# Patient Record
Sex: FEMALE | Race: WHITE | Employment: OTHER | URBAN - METROPOLITAN AREA
[De-identification: names, ages, dates, MRNs, and addresses within clinical notes are randomized per-mention and may not be internally consistent; named-entity substitution may affect disease eponyms.]

---

## 2019-06-17 ENCOUNTER — OFFICE VISIT (OUTPATIENT)
Dept: ORTHOPEDIC SURGERY | Age: 69
End: 2019-06-17
Payer: MEDICARE

## 2019-06-17 DIAGNOSIS — M77.42 METATARSALGIA OF LEFT FOOT: ICD-10-CM

## 2019-06-17 DIAGNOSIS — M79.672 LEFT FOOT PAIN: ICD-10-CM

## 2019-06-17 DIAGNOSIS — S96.912A STRAIN OF LEFT FOOT, INITIAL ENCOUNTER: Primary | ICD-10-CM

## 2019-06-17 DIAGNOSIS — Z87.828 HX OF LACERATION OF SKIN: ICD-10-CM

## 2019-06-17 DIAGNOSIS — M67.472 GANGLION CYST OF LEFT FOOT: ICD-10-CM

## 2019-06-17 PROCEDURE — L3260 AMBULATORY SURGICAL BOOT EAC: HCPCS | Performed by: INTERNAL MEDICINE

## 2019-06-17 PROCEDURE — 99203 OFFICE O/P NEW LOW 30 MIN: CPT | Performed by: INTERNAL MEDICINE

## 2019-06-17 NOTE — PROGRESS NOTES
Chief Complaint:   Chief Complaint   Patient presents with    Ankle Pain     NEW L ANKLE/FOOT PAIN          History of Present Illness:       Patient is a 76 y.o. female presents with the above complaint. The symptoms began 9 daysago and started without an injury. The patient describes a sharp, pins and needles pain over the medial foot into the toes and tentatively overlying the dorsal prominence of the foot that does not radiate. The symptoms are intermittent  and are show no change since the onset. Pain localizes to the dorsum of the foot and MTP joint  aspect and  is predictably aggravated by weightbearing. There are not mechanical symptoms associated with the symptoms. There are occasional neuritic symptoms involving the foot radiating from the medial aspect into the toes. The patient denies subjective instability about the foot or ankle and denies new onset or progressive weakness of the lower extremity. Pain level 8    The patient admits to a pattern of activity related swelling. Treatment to date: X-ray evaluation    There is  history of foot or ankle trauma related to a skin laceration localizing to the anterolateral aspect of the ankle in the region of the superficial peroneal nerve. She has residual hypersensitivity in this anatomic region at the site of the scar. There is no prior history of autoimmune disease, crystal arthropathy, or crystal arthropathy. Past Medical History:        Past Medical History:   Diagnosis Date    Diverticulitis     Hyperlipidemia     Hypertension          Past Surgical History:   Procedure Laterality Date    CORONARY ANGIOPLASTY WITH STENT PLACEMENT      OVARY REMOVAL  right    FOR OVARIAN CYSTS         Present Medications:         Current Outpatient Medications   Medication Sig Dispense Refill    lisinopril-hydrochlorothiazide (PRINZIDE;ZESTORETIC) 20-12.5 MG per tablet Take 1 tablet by mouth daily.          No current facility-administered medications for this visit. Allergies:      No Known Allergies     Social History:         Social History     Socioeconomic History    Marital status:      Spouse name: Not on file    Number of children: Not on file    Years of education: Not on file    Highest education level: Not on file   Occupational History    Not on file   Social Needs    Financial resource strain: Not on file    Food insecurity:     Worry: Not on file     Inability: Not on file    Transportation needs:     Medical: Not on file     Non-medical: Not on file   Tobacco Use    Smoking status: Never Smoker   Substance and Sexual Activity    Alcohol use: No    Drug use: No    Sexual activity: Not on file   Lifestyle    Physical activity:     Days per week: Not on file     Minutes per session: Not on file    Stress: Not on file   Relationships    Social connections:     Talks on phone: Not on file     Gets together: Not on file     Attends [employer] service: Not on file     Active member of club or organization: Not on file     Attends meetings of clubs or organizations: Not on file     Relationship status: Not on file    Intimate partner violence:     Fear of current or ex partner: Not on file     Emotionally abused: Not on file     Physically abused: Not on file     Forced sexual activity: Not on file   Other Topics Concern    Not on file   Social History Narrative    Not on file        Review of Symptoms:    Pertinent items are noted in HPI      Vital Signs: There were no vitals filed for this visit. General Exam:     Constitutional: Patient is adequately groomed with no evidence of malnutrition  Mental Status: The patient is oriented to time, place and person. The patient's mood and affect are appropriate. Vascular: Examination reveals no swelling or calf tenderness. Peripheral pulses are palpable and 2+.     Lymphatics: no lymphadenopathy of the inguinal region or lower extremity      Physical Exam: by a healthcare professional with expert knowledge and specialization in brace application. Verbal and written instructions for the use of and application of this item were provided. They were instructed to contact the office immediately should the brace result in increased pain, decreased sensation, increased swelling or worsening of the condition. Other Outside Imaging and Testing Personally Reviewed:     2019 June  XR ANKLE LEFT AP LATERAL AND OBLIQUE6/8/2019  [facility]   Result Impression       No acute fracture or joint effusion    Mild bimalleolar ankle arthrosis    Calcaneal spurs. Result Narrative   HISTORY:  pain  ENTIRE LEFT ANKLE PAIN, NO INJURY  COMPARISON: None  NOTE:  If there are questions about the content of this report, please contact [address] [place] [address] radiology by calling [phone]    FINDINGS:  BONES:  Mild spurring of both malleoli demonstrated. JOINTS:  Unremarkable.   No evidence of significant joint space narrowing, spurring, or malalignment  SOFT TISSUES:  Unremarkable  OTHER:  None   Other Result Information   Interface, Rad Results In - 06/08/2019  4:50 PM EDT  HISTORY:  pain  ENTIRE LEFT ANKLE PAIN, NO INJURY  COMPARISON: None  NOTE:  If there are questions about the content of this report, please contact [facility] radiology by calling [phone]    FINDINGS:  BONES:  Mild spurring of both malleoli demonstrated. JOINTS:  Unremarkable. No evidence of significant joint space narrowing, spurring, or malalignment  SOFT TISSUES:  Unremarkable  OTHER:  None    IMPRESSION      No acute fracture or joint effusion    Mild bimalleolar ankle arthrosis    Calcaneal spurs. Status               Assessment   Impression: . Encounter Diagnoses   Name Primary?     Strain of left foot, initial encounter Yes    Ganglion cyst of left foot     Metatarsalgia of left foot     Hx of laceration of skin     Left foot pain               Plan:     Postop shoe immobilization and foot intrinsics  Limited ultrasound evaluation of the soft tissue cystic prominence and consider aspiration of the cyst under ultrasound guidance at that time  Monitor neuritic symptoms and consider EMG left lower extremity PRN low clinical suspicion for radicular pain at this time  Consider compounding cream for the neuropathic pain localizing to the previous site of skin laceration    The neuritic pain in her foot may well be related to soft tissue swelling and peripheral nerve irritation proceed as outlined above      The nature of the finding, probable diagnosis and likely treatment was thoroughly discussed with the patient. The options, risks, complications, alternative treatment as well as some of the differential diagnosis was discussed. The patient was thoroughly informed and all questions were answered. the patient indicated understanding and satisfaction with the discussion. Orders:        Orders Placed This Encounter   Procedures    DJO Post-Op Shoe     Patient was prescribed a DJO Post-Op Shoe. The left foot will require stabilization / immobilization from this semi-rigid / rigid orthosis to improve their function. The orthosis will assist in protecting the affected area, provide functional support and facilitate healing. Patient was instructed to progress ambulation weight bearing as tolerated in the device. The patient was educated and fit by a healthcare professional with expert knowledge and specialization in brace application. Verbal and written instructions for the use of and application of this item were provided. They were instructed to contact the office immediately should the brace result in increased pain, decreased sensation, increased swelling or worsening of the condition. Disclaimer: \"This note was dictated with voice recognition software. Though review and correction are routine, we apologize for any errors. \"

## 2019-06-24 ENCOUNTER — OFFICE VISIT (OUTPATIENT)
Dept: ORTHOPEDIC SURGERY | Age: 69
End: 2019-06-24
Payer: MEDICARE

## 2019-06-24 VITALS — HEIGHT: 60 IN | WEIGHT: 173 LBS | BODY MASS INDEX: 33.96 KG/M2

## 2019-06-24 DIAGNOSIS — M77.42 METATARSALGIA OF LEFT FOOT: ICD-10-CM

## 2019-06-24 DIAGNOSIS — S96.912D STRAIN OF LEFT FOOT, SUBSEQUENT ENCOUNTER: ICD-10-CM

## 2019-06-24 DIAGNOSIS — M67.472 GANGLION CYST OF LEFT FOOT: Primary | ICD-10-CM

## 2019-06-24 PROCEDURE — G8417 CALC BMI ABV UP PARAM F/U: HCPCS | Performed by: INTERNAL MEDICINE

## 2019-06-24 PROCEDURE — G8400 PT W/DXA NO RESULTS DOC: HCPCS | Performed by: INTERNAL MEDICINE

## 2019-06-24 PROCEDURE — G8427 DOCREV CUR MEDS BY ELIG CLIN: HCPCS | Performed by: INTERNAL MEDICINE

## 2019-06-24 PROCEDURE — 1123F ACP DISCUSS/DSCN MKR DOCD: CPT | Performed by: INTERNAL MEDICINE

## 2019-06-24 PROCEDURE — 1090F PRES/ABSN URINE INCON ASSESS: CPT | Performed by: INTERNAL MEDICINE

## 2019-06-24 PROCEDURE — 4004F PT TOBACCO SCREEN RCVD TLK: CPT | Performed by: INTERNAL MEDICINE

## 2019-06-24 PROCEDURE — 4040F PNEUMOC VAC/ADMIN/RCVD: CPT | Performed by: INTERNAL MEDICINE

## 2019-06-24 PROCEDURE — 3017F COLORECTAL CA SCREEN DOC REV: CPT | Performed by: INTERNAL MEDICINE

## 2019-06-24 PROCEDURE — 99213 OFFICE O/P EST LOW 20 MIN: CPT | Performed by: INTERNAL MEDICINE

## 2019-11-22 ENCOUNTER — APPOINTMENT (OUTPATIENT)
Dept: GENERAL RADIOLOGY | Age: 69
End: 2019-11-22
Payer: MEDICARE

## 2019-11-22 ENCOUNTER — HOSPITAL ENCOUNTER (EMERGENCY)
Age: 69
Discharge: HOME | End: 2019-11-22
Attending: EMERGENCY MEDICINE
Payer: MEDICARE

## 2019-11-22 VITALS
BODY MASS INDEX: 34.36 KG/M2 | DIASTOLIC BLOOD PRESSURE: 63 MMHG | HEART RATE: 72 BPM | HEIGHT: 60 IN | WEIGHT: 175 LBS | TEMPERATURE: 98.5 F | OXYGEN SATURATION: 99 % | SYSTOLIC BLOOD PRESSURE: 171 MMHG | RESPIRATION RATE: 17 BRPM

## 2019-11-22 DIAGNOSIS — E86.0 DEHYDRATION: ICD-10-CM

## 2019-11-22 DIAGNOSIS — R55 SYNCOPE, UNSPECIFIED SYNCOPE TYPE: Primary | ICD-10-CM

## 2019-11-22 LAB
ANION GAP SERPL CALCULATED.3IONS-SCNC: 11 MMOL/L (ref 3–16)
BASOPHILS ABSOLUTE: 0 K/UL (ref 0–0.2)
BASOPHILS RELATIVE PERCENT: 0.5 %
BILIRUBIN URINE: NEGATIVE
BLOOD, URINE: NEGATIVE
BUN BLDV-MCNC: 24 MG/DL (ref 7–20)
CALCIUM SERPL-MCNC: 9.1 MG/DL (ref 8.3–10.6)
CHLORIDE BLD-SCNC: 103 MMOL/L (ref 99–110)
CLARITY: CLEAR
CO2: 23 MMOL/L (ref 21–32)
COLOR: YELLOW
CREAT SERPL-MCNC: 0.7 MG/DL (ref 0.6–1.2)
EOSINOPHILS ABSOLUTE: 0 K/UL (ref 0–0.6)
EOSINOPHILS RELATIVE PERCENT: 0.3 %
GFR AFRICAN AMERICAN: >60
GFR NON-AFRICAN AMERICAN: >60
GLUCOSE BLD-MCNC: 190 MG/DL (ref 70–99)
GLUCOSE BLD-MCNC: 201 MG/DL (ref 70–99)
GLUCOSE URINE: 500 MG/DL
HCT VFR BLD CALC: 36.6 % (ref 36–48)
HEMOGLOBIN: 12.5 G/DL (ref 12–16)
KETONES, URINE: NEGATIVE MG/DL
LEUKOCYTE ESTERASE, URINE: NEGATIVE
LYMPHOCYTES ABSOLUTE: 1.2 K/UL (ref 1–5.1)
LYMPHOCYTES RELATIVE PERCENT: 13.6 %
MCH RBC QN AUTO: 29.6 PG (ref 26–34)
MCHC RBC AUTO-ENTMCNC: 34 G/DL (ref 31–36)
MCV RBC AUTO: 86.9 FL (ref 80–100)
MICROSCOPIC EXAMINATION: ABNORMAL
MONOCYTES ABSOLUTE: 0.8 K/UL (ref 0–1.3)
MONOCYTES RELATIVE PERCENT: 9.5 %
NEUTROPHILS ABSOLUTE: 6.4 K/UL (ref 1.7–7.7)
NEUTROPHILS RELATIVE PERCENT: 76.1 %
NITRITE, URINE: NEGATIVE
PDW BLD-RTO: 13.5 % (ref 12.4–15.4)
PERFORMED ON: ABNORMAL
PH UA: 6 (ref 5–8)
PLATELET # BLD: 183 K/UL (ref 135–450)
PMV BLD AUTO: 8.3 FL (ref 5–10.5)
POTASSIUM REFLEX MAGNESIUM: 4.4 MMOL/L (ref 3.5–5.1)
PROTEIN UA: NEGATIVE MG/DL
RBC # BLD: 4.21 M/UL (ref 4–5.2)
SODIUM BLD-SCNC: 137 MMOL/L (ref 136–145)
SPECIFIC GRAVITY UA: 1.02 (ref 1–1.03)
URINE REFLEX TO CULTURE: ABNORMAL
URINE TYPE: ABNORMAL
UROBILINOGEN, URINE: 0.2 E.U./DL
WBC # BLD: 8.5 K/UL (ref 4–11)

## 2019-11-22 PROCEDURE — 93005 ELECTROCARDIOGRAM TRACING: CPT | Performed by: STUDENT IN AN ORGANIZED HEALTH CARE EDUCATION/TRAINING PROGRAM

## 2019-11-22 PROCEDURE — 99284 EMERGENCY DEPT VISIT MOD MDM: CPT

## 2019-11-22 PROCEDURE — 81003 URINALYSIS AUTO W/O SCOPE: CPT

## 2019-11-22 PROCEDURE — 71046 X-RAY EXAM CHEST 2 VIEWS: CPT

## 2019-11-22 PROCEDURE — 2580000003 HC RX 258: Performed by: STUDENT IN AN ORGANIZED HEALTH CARE EDUCATION/TRAINING PROGRAM

## 2019-11-22 PROCEDURE — 85025 COMPLETE CBC W/AUTO DIFF WBC: CPT

## 2019-11-22 PROCEDURE — 96360 HYDRATION IV INFUSION INIT: CPT

## 2019-11-22 PROCEDURE — 80048 BASIC METABOLIC PNL TOTAL CA: CPT

## 2019-11-22 RX ORDER — CLOPIDOGREL BISULFATE 75 MG/1
75 TABLET ORAL DAILY
COMMUNITY

## 2019-11-22 RX ORDER — SODIUM CHLORIDE, SODIUM LACTATE, POTASSIUM CHLORIDE, CALCIUM CHLORIDE 600; 310; 30; 20 MG/100ML; MG/100ML; MG/100ML; MG/100ML
1000 INJECTION, SOLUTION INTRAVENOUS ONCE
Status: COMPLETED | OUTPATIENT
Start: 2019-11-22 | End: 2019-11-22

## 2019-11-22 RX ADMIN — SODIUM CHLORIDE, POTASSIUM CHLORIDE, SODIUM LACTATE AND CALCIUM CHLORIDE 1000 ML: 600; 310; 30; 20 INJECTION, SOLUTION INTRAVENOUS at 20:21

## 2019-11-22 ASSESSMENT — ENCOUNTER SYMPTOMS
COUGH: 0
CONSTIPATION: 0
VOMITING: 0
SHORTNESS OF BREATH: 0
ABDOMINAL PAIN: 0
BACK PAIN: 0

## 2019-11-23 LAB
EKG ATRIAL RATE: 77 BPM
EKG DIAGNOSIS: NORMAL
EKG P AXIS: 36 DEGREES
EKG P-R INTERVAL: 142 MS
EKG Q-T INTERVAL: 388 MS
EKG QRS DURATION: 90 MS
EKG QTC CALCULATION (BAZETT): 439 MS
EKG R AXIS: -35 DEGREES
EKG T AXIS: 19 DEGREES
EKG VENTRICULAR RATE: 77 BPM

## 2022-01-08 ENCOUNTER — HOSPITAL ENCOUNTER (INPATIENT)
Age: 72
LOS: 3 days | Discharge: HOME | DRG: 392 | End: 2022-01-13
Attending: INTERNAL MEDICINE | Admitting: INTERNAL MEDICINE
Payer: MEDICARE

## 2022-01-08 ENCOUNTER — APPOINTMENT (OUTPATIENT)
Dept: CT IMAGING | Age: 72
DRG: 392 | End: 2022-01-08
Payer: MEDICARE

## 2022-01-08 DIAGNOSIS — R11.2 NAUSEA VOMITING AND DIARRHEA: Primary | ICD-10-CM

## 2022-01-08 DIAGNOSIS — R06.02 SHORTNESS OF BREATH: ICD-10-CM

## 2022-01-08 DIAGNOSIS — E86.0 DEHYDRATION: ICD-10-CM

## 2022-01-08 DIAGNOSIS — R93.5 ABNORMAL ABDOMINAL CT SCAN: ICD-10-CM

## 2022-01-08 DIAGNOSIS — R19.7 NAUSEA VOMITING AND DIARRHEA: Primary | ICD-10-CM

## 2022-01-08 DIAGNOSIS — E87.1 HYPONATREMIA: ICD-10-CM

## 2022-01-08 LAB
A/G RATIO: 1.1 (ref 1.1–2.2)
ALBUMIN SERPL-MCNC: 4 G/DL (ref 3.4–5)
ALP BLD-CCNC: 80 U/L (ref 40–129)
ALT SERPL-CCNC: 15 U/L (ref 10–40)
ANION GAP SERPL CALCULATED.3IONS-SCNC: 14 MMOL/L (ref 3–16)
AST SERPL-CCNC: 37 U/L (ref 15–37)
BACTERIA: ABNORMAL /HPF
BASOPHILS ABSOLUTE: 0 K/UL (ref 0–0.2)
BASOPHILS RELATIVE PERCENT: 0.2 %
BILIRUB SERPL-MCNC: 0.4 MG/DL (ref 0–1)
BILIRUBIN URINE: ABNORMAL
BLOOD, URINE: NEGATIVE
BUN BLDV-MCNC: 20 MG/DL (ref 7–20)
CALCIUM SERPL-MCNC: 9 MG/DL (ref 8.3–10.6)
CHLORIDE BLD-SCNC: 95 MMOL/L (ref 99–110)
CLARITY: ABNORMAL
CO2: 18 MMOL/L (ref 21–32)
COLOR: YELLOW
CREAT SERPL-MCNC: 0.6 MG/DL (ref 0.6–1.2)
EOSINOPHILS ABSOLUTE: 0 K/UL (ref 0–0.6)
EOSINOPHILS RELATIVE PERCENT: 0.2 %
EPITHELIAL CELLS, UA: 13 /HPF (ref 0–5)
GFR AFRICAN AMERICAN: >60
GFR NON-AFRICAN AMERICAN: >60
GLUCOSE BLD-MCNC: 188 MG/DL (ref 70–99)
GLUCOSE BLD-MCNC: 245 MG/DL (ref 70–99)
GLUCOSE URINE: >=1000 MG/DL
HCT VFR BLD CALC: 42.2 % (ref 36–48)
HEMOGLOBIN: 14.4 G/DL (ref 12–16)
KETONES, URINE: 40 MG/DL
LEUKOCYTE ESTERASE, URINE: NEGATIVE
LIPASE: 20 U/L (ref 13–60)
LYMPHOCYTES ABSOLUTE: 0.4 K/UL (ref 1–5.1)
LYMPHOCYTES RELATIVE PERCENT: 4.4 %
MCH RBC QN AUTO: 29 PG (ref 26–34)
MCHC RBC AUTO-ENTMCNC: 34.1 G/DL (ref 31–36)
MCV RBC AUTO: 85.1 FL (ref 80–100)
MICROSCOPIC EXAMINATION: YES
MONOCYTES ABSOLUTE: 0.5 K/UL (ref 0–1.3)
MONOCYTES RELATIVE PERCENT: 4.9 %
NEUTROPHILS ABSOLUTE: 8.9 K/UL (ref 1.7–7.7)
NEUTROPHILS RELATIVE PERCENT: 90.3 %
NITRITE, URINE: NEGATIVE
PDW BLD-RTO: 13.6 % (ref 12.4–15.4)
PERFORMED ON: ABNORMAL
PH UA: 5.5 (ref 5–8)
PLATELET # BLD: 217 K/UL (ref 135–450)
PMV BLD AUTO: 8.4 FL (ref 5–10.5)
POTASSIUM SERPL-SCNC: 4.8 MMOL/L (ref 3.5–5.1)
PRO-BNP: 370 PG/ML (ref 0–124)
PROTEIN UA: ABNORMAL MG/DL
RBC # BLD: 4.96 M/UL (ref 4–5.2)
RBC UA: 5 /HPF (ref 0–4)
SODIUM BLD-SCNC: 127 MMOL/L (ref 136–145)
SPECIFIC GRAVITY UA: >1.03 (ref 1–1.03)
TOTAL PROTEIN: 7.6 G/DL (ref 6.4–8.2)
TROPONIN: <0.01 NG/ML
URINE REFLEX TO CULTURE: ABNORMAL
URINE TYPE: ABNORMAL
UROBILINOGEN, URINE: 1 E.U./DL
WBC # BLD: 9.9 K/UL (ref 4–11)
WBC UA: 4 /HPF (ref 0–5)

## 2022-01-08 PROCEDURE — 84484 ASSAY OF TROPONIN QUANT: CPT

## 2022-01-08 PROCEDURE — 96361 HYDRATE IV INFUSION ADD-ON: CPT

## 2022-01-08 PROCEDURE — 85025 COMPLETE CBC W/AUTO DIFF WBC: CPT

## 2022-01-08 PROCEDURE — 74177 CT ABD & PELVIS W/CONTRAST: CPT

## 2022-01-08 PROCEDURE — 99284 EMERGENCY DEPT VISIT MOD MDM: CPT

## 2022-01-08 PROCEDURE — 83880 ASSAY OF NATRIURETIC PEPTIDE: CPT

## 2022-01-08 PROCEDURE — 6360000002 HC RX W HCPCS: Performed by: PHYSICIAN ASSISTANT

## 2022-01-08 PROCEDURE — 84300 ASSAY OF URINE SODIUM: CPT

## 2022-01-08 PROCEDURE — 83930 ASSAY OF BLOOD OSMOLALITY: CPT

## 2022-01-08 PROCEDURE — 81001 URINALYSIS AUTO W/SCOPE: CPT

## 2022-01-08 PROCEDURE — 36415 COLL VENOUS BLD VENIPUNCTURE: CPT

## 2022-01-08 PROCEDURE — 80053 COMPREHEN METABOLIC PANEL: CPT

## 2022-01-08 PROCEDURE — G0378 HOSPITAL OBSERVATION PER HR: HCPCS

## 2022-01-08 PROCEDURE — 96374 THER/PROPH/DIAG INJ IV PUSH: CPT

## 2022-01-08 PROCEDURE — 83036 HEMOGLOBIN GLYCOSYLATED A1C: CPT

## 2022-01-08 PROCEDURE — 2580000003 HC RX 258: Performed by: INTERNAL MEDICINE

## 2022-01-08 PROCEDURE — 83690 ASSAY OF LIPASE: CPT

## 2022-01-08 PROCEDURE — 71260 CT THORAX DX C+: CPT

## 2022-01-08 PROCEDURE — 6370000000 HC RX 637 (ALT 250 FOR IP): Performed by: INTERNAL MEDICINE

## 2022-01-08 PROCEDURE — 96375 TX/PRO/DX INJ NEW DRUG ADDON: CPT

## 2022-01-08 PROCEDURE — 6360000004 HC RX CONTRAST MEDICATION: Performed by: PHYSICIAN ASSISTANT

## 2022-01-08 PROCEDURE — 2580000003 HC RX 258: Performed by: PHYSICIAN ASSISTANT

## 2022-01-08 PROCEDURE — 83935 ASSAY OF URINE OSMOLALITY: CPT

## 2022-01-08 RX ORDER — SODIUM CHLORIDE 0.9 % (FLUSH) 0.9 %
5-40 SYRINGE (ML) INJECTION EVERY 12 HOURS SCHEDULED
Status: DISCONTINUED | OUTPATIENT
Start: 2022-01-08 | End: 2022-01-13 | Stop reason: HOSPADM

## 2022-01-08 RX ORDER — INSULIN LISPRO 100 [IU]/ML
0-6 INJECTION, SOLUTION INTRAVENOUS; SUBCUTANEOUS EVERY 4 HOURS
Status: DISCONTINUED | OUTPATIENT
Start: 2022-01-08 | End: 2022-01-11

## 2022-01-08 RX ORDER — SODIUM CHLORIDE 9 MG/ML
INJECTION, SOLUTION INTRAVENOUS CONTINUOUS
Status: ACTIVE | OUTPATIENT
Start: 2022-01-08 | End: 2022-01-10

## 2022-01-08 RX ORDER — SODIUM CHLORIDE 0.9 % (FLUSH) 0.9 %
10 SYRINGE (ML) INJECTION PRN
Status: DISCONTINUED | OUTPATIENT
Start: 2022-01-08 | End: 2022-01-13 | Stop reason: HOSPADM

## 2022-01-08 RX ORDER — ONDANSETRON 2 MG/ML
4 INJECTION INTRAMUSCULAR; INTRAVENOUS EVERY 6 HOURS PRN
Status: DISCONTINUED | OUTPATIENT
Start: 2022-01-08 | End: 2022-01-13 | Stop reason: HOSPADM

## 2022-01-08 RX ORDER — MAGNESIUM SULFATE 1 G/100ML
1000 INJECTION INTRAVENOUS PRN
Status: DISCONTINUED | OUTPATIENT
Start: 2022-01-08 | End: 2022-01-13 | Stop reason: HOSPADM

## 2022-01-08 RX ORDER — LISINOPRIL 20 MG/1
20 TABLET ORAL DAILY
Status: DISCONTINUED | OUTPATIENT
Start: 2022-01-09 | End: 2022-01-13 | Stop reason: HOSPADM

## 2022-01-08 RX ORDER — NICOTINE POLACRILEX 4 MG
15 LOZENGE BUCCAL PRN
Status: DISCONTINUED | OUTPATIENT
Start: 2022-01-08 | End: 2022-01-13 | Stop reason: HOSPADM

## 2022-01-08 RX ORDER — 0.9 % SODIUM CHLORIDE 0.9 %
1000 INTRAVENOUS SOLUTION INTRAVENOUS ONCE
Status: COMPLETED | OUTPATIENT
Start: 2022-01-08 | End: 2022-01-08

## 2022-01-08 RX ORDER — POTASSIUM CHLORIDE 7.45 MG/ML
10 INJECTION INTRAVENOUS PRN
Status: DISCONTINUED | OUTPATIENT
Start: 2022-01-08 | End: 2022-01-13 | Stop reason: HOSPADM

## 2022-01-08 RX ORDER — MORPHINE SULFATE 4 MG/ML
4 INJECTION, SOLUTION INTRAMUSCULAR; INTRAVENOUS ONCE
Status: COMPLETED | OUTPATIENT
Start: 2022-01-08 | End: 2022-01-08

## 2022-01-08 RX ORDER — ACETAMINOPHEN 650 MG/1
650 SUPPOSITORY RECTAL EVERY 6 HOURS PRN
Status: DISCONTINUED | OUTPATIENT
Start: 2022-01-08 | End: 2022-01-13 | Stop reason: HOSPADM

## 2022-01-08 RX ORDER — CLOPIDOGREL BISULFATE 75 MG/1
75 TABLET ORAL DAILY
Status: DISCONTINUED | OUTPATIENT
Start: 2022-01-09 | End: 2022-01-13 | Stop reason: HOSPADM

## 2022-01-08 RX ORDER — POLYETHYLENE GLYCOL 3350 17 G/17G
17 POWDER, FOR SOLUTION ORAL DAILY PRN
Status: DISCONTINUED | OUTPATIENT
Start: 2022-01-08 | End: 2022-01-13 | Stop reason: HOSPADM

## 2022-01-08 RX ORDER — ONDANSETRON 4 MG/1
4 TABLET, ORALLY DISINTEGRATING ORAL EVERY 8 HOURS PRN
Status: DISCONTINUED | OUTPATIENT
Start: 2022-01-08 | End: 2022-01-13 | Stop reason: HOSPADM

## 2022-01-08 RX ORDER — SODIUM CHLORIDE 9 MG/ML
25 INJECTION, SOLUTION INTRAVENOUS PRN
Status: DISCONTINUED | OUTPATIENT
Start: 2022-01-08 | End: 2022-01-13 | Stop reason: HOSPADM

## 2022-01-08 RX ORDER — DEXTROSE MONOHYDRATE 25 G/50ML
12.5 INJECTION, SOLUTION INTRAVENOUS PRN
Status: DISCONTINUED | OUTPATIENT
Start: 2022-01-08 | End: 2022-01-13 | Stop reason: HOSPADM

## 2022-01-08 RX ORDER — ACETAMINOPHEN 325 MG/1
650 TABLET ORAL EVERY 6 HOURS PRN
Status: DISCONTINUED | OUTPATIENT
Start: 2022-01-08 | End: 2022-01-13 | Stop reason: HOSPADM

## 2022-01-08 RX ORDER — DEXTROSE MONOHYDRATE 50 MG/ML
100 INJECTION, SOLUTION INTRAVENOUS PRN
Status: DISCONTINUED | OUTPATIENT
Start: 2022-01-08 | End: 2022-01-13 | Stop reason: HOSPADM

## 2022-01-08 RX ORDER — ONDANSETRON 2 MG/ML
4 INJECTION INTRAMUSCULAR; INTRAVENOUS ONCE
Status: COMPLETED | OUTPATIENT
Start: 2022-01-08 | End: 2022-01-08

## 2022-01-08 RX ADMIN — SODIUM CHLORIDE: 9 INJECTION, SOLUTION INTRAVENOUS at 20:36

## 2022-01-08 RX ADMIN — INSULIN LISPRO 1 UNITS: 100 INJECTION, SOLUTION INTRAVENOUS; SUBCUTANEOUS at 23:05

## 2022-01-08 RX ADMIN — ONDANSETRON 4 MG: 2 INJECTION INTRAMUSCULAR; INTRAVENOUS at 17:31

## 2022-01-08 RX ADMIN — MORPHINE SULFATE 4 MG: 4 INJECTION INTRAVENOUS at 17:32

## 2022-01-08 RX ADMIN — IOPAMIDOL 75 ML: 755 INJECTION, SOLUTION INTRAVENOUS at 17:40

## 2022-01-08 RX ADMIN — SODIUM CHLORIDE 1000 ML: 9 INJECTION, SOLUTION INTRAVENOUS at 17:36

## 2022-01-08 RX ADMIN — INSULIN GLARGINE 12 UNITS: 100 INJECTION, SOLUTION SUBCUTANEOUS at 23:05

## 2022-01-08 ASSESSMENT — ENCOUNTER SYMPTOMS
SHORTNESS OF BREATH: 1
ANAL BLEEDING: 0
ABDOMINAL PAIN: 1
RECTAL PAIN: 0
SHORTNESS OF BREATH: 0
COLOR CHANGE: 0
CONSTIPATION: 0
EYES NEGATIVE: 1
NAUSEA: 1
DIARRHEA: 1
STRIDOR: 0
VOMITING: 1
BACK PAIN: 1
COUGH: 1
ABDOMINAL DISTENTION: 1
WHEEZING: 0
BLOOD IN STOOL: 0

## 2022-01-08 ASSESSMENT — PAIN SCALES - GENERAL
PAINLEVEL_OUTOF10: 9
PAINLEVEL_OUTOF10: 0
PAINLEVEL_OUTOF10: 9
PAINLEVEL_OUTOF10: 0

## 2022-01-08 NOTE — ED PROVIDER NOTES
[facility]        Pt Name: Katy Kong  MRN: [id number]  [place] 1950  Date of evaluation: 1/8/2022  Provider: Vince Gallegos PA-C  PCP: Michael Parish  Note Started: 3:56 PM EST       CARMEN. I have evaluated this patient. My supervising physician was available for consultation. CHIEF COMPLAINT       Chief Complaint   Patient presents with    Emesis     Pt reports emesis, and abdominal pain, HX diverticulitis        HISTORY OF PRESENT ILLNESS   (Location, Timing/Onset, Context/Setting, Quality, Duration, Modifying Factors, Severity, Associated Signs and Symptoms)  Note limiting factors. Chief Complaint: Abdominal pain, nausea, vomiting, diarrhea    Katy Kong is a 70 y.o. female who presents to the emergency department with the primary chief complaint of abdominal pain, nausea, vomiting and diarrhea for the past few days. She has history of diverticulitis and this feels similar. She rates her pain to be a 9 out of 10 on pain scale without radiation of symptoms. Denies bloody or black stool. Denies bloody, bilious or coffee-ground emesis. Secondly, patient was recently diagnosed with COVID-19 on 12/3/2021. She states that she has continued to feel short of breath since then and has a little bit of upper back pain between her shoulder blades. She is concerned that she might have a blood clot. Her coughing has gotten better. Denies hemoptysis. Nursing Notes were all reviewed and agreed with or any disagreements were addressed in the HPI. REVIEW OF SYSTEMS    (2-9 systems for level 4, 10 or more for level 5)     Review of Systems   Constitutional: Negative for chills and fever. HENT: Negative. Eyes: Negative for visual disturbance. Respiratory: Positive for cough and shortness of breath. Negative for wheezing and stridor. Cardiovascular: Negative for chest pain, palpitations and leg swelling. Gastrointestinal: Positive for abdominal distention, abdominal pain, diarrhea, nausea and vomiting. Negative for anal bleeding, blood in stool, constipation and rectal pain. Genitourinary: Negative. Musculoskeletal: Positive for back pain. Negative for neck pain and neck stiffness. Skin: Negative for color change, pallor, rash and wound. Neurological: Negative for dizziness, tremors, seizures, syncope, facial asymmetry, speech difficulty, weakness, light-headedness, numbness and headaches. Psychiatric/Behavioral: Negative for confusion. All other systems reviewed and are negative. Positives and Pertinent negatives as per HPI. Except as noted above in the ROS, all other systems were reviewed and negative. PAST MEDICAL HISTORY     Past Medical History:   Diagnosis Date    Diverticulitis     Hyperlipidemia     Hypertension          SURGICAL HISTORY     Past Surgical History:   Procedure Laterality Date    CORONARY ANGIOPLASTY WITH STENT PLACEMENT      OVARY REMOVAL  right    FOR OVARIAN CYSTS         CURRENTMEDICATIONS       Previous Medications    CLOPIDOGREL (PLAVIX) 75 MG TABLET    Take 75 mg by mouth daily    LISINOPRIL-HYDROCHLOROTHIAZIDE (PRINZIDE;ZESTORETIC) 20-12.5 MG PER TABLET    Take 1 tablet by mouth daily. METFORMIN (GLUCOPHAGE) 500 MG TABLET    Take 500 mg by mouth 2 times daily (with meals)    METOPROLOL SUCCINATE PO    Take by mouth         ALLERGIES     Patient has no known allergies. FAMILYHISTORY     History reviewed. No pertinent family history.        SOCIAL HISTORY       Social History     Tobacco Use    Smoking status: Never Smoker    Smokeless tobacco: Never Used   Vaping Use    Vaping Use: Never used   Substance Use Topics    Alcohol use: No    Drug use: No       SCREENINGS             PHYSICAL EXAM    (up to 7 for level 4, 8 or more for level 5)     ED Triage Vitals [01/08/22 1536]   BP Temp Temp src Pulse Resp SpO2 Height Weight   (!) 140/65 98.4 °F (36.9 °C) -- 68 19 100 % -- 175 lb (79.4 kg)       Physical Exam  Vitals and nursing note reviewed. Constitutional:       Appearance: Normal appearance. She is well-developed. She is not toxic-appearing or diaphoretic. HENT:      Head: Normocephalic and atraumatic. Right Ear: External ear normal.      Left Ear: External ear normal.      Nose: Nose normal.      Mouth/Throat:      Mouth: Mucous membranes are moist.      Pharynx: Oropharynx is clear. Eyes:      General: No scleral icterus. Right eye: No discharge. Left eye: No discharge. Extraocular Movements: Extraocular movements intact. Conjunctiva/sclera: Conjunctivae normal.      Pupils: Pupils are equal, round, and reactive to light. Cardiovascular:      Rate and Rhythm: Normal rate. Pulmonary:      Effort: Pulmonary effort is normal.      Breath sounds: Normal breath sounds. Abdominal:      General: Bowel sounds are normal. There is no abdominal bruit. Palpations: Abdomen is soft. There is no pulsatile mass. Tenderness: There is abdominal tenderness in the right lower quadrant and left lower quadrant. There is no right CVA tenderness, left CVA tenderness, guarding or rebound. Negative signs include Mcmullen's sign, Rovsing's sign, McBurney's sign, psoas sign and obturator sign. Musculoskeletal:         General: Normal range of motion. Cervical back: Normal range of motion. Skin:     General: Skin is warm and dry. Coloration: Skin is not jaundiced or pale. Findings: No bruising, erythema, lesion or rash. Neurological:      Mental Status: She is alert and oriented to person, place, and time. Mental status is at baseline.    Psychiatric:         Behavior: Behavior normal.         DIAGNOSTIC RESULTS   LABS:    Labs Reviewed   CBC WITH AUTO DIFFERENTIAL - Abnormal; Notable for the following components:       Result Value    Neutrophils Absolute 8.9 (*)     Lymphocytes Absolute 0.4 (*)     All other components within normal limits    Narrative:     Performed at:  [facility] [address], [place]   Phone [phone]   COMPREHENSIVE METABOLIC PANEL - Abnormal; Notable for the following components:    Sodium 127 (*)     Chloride 95 (*)     CO2 18 (*)     Glucose 245 (*)     All other components within normal limits    Narrative:     Performed at:  [facility] [address], [place]   Phone [phone]   BRAIN NATRIURETIC PEPTIDE - Abnormal; Notable for the following components:    Pro- (*)     All other components within normal limits    Narrative:     Performed at:  [facility] [address], [place]   Phone [phone]   URINE RT REFLEX TO CULTURE - Abnormal; Notable for the following components:    Clarity, UA CLOUDY (*)     Glucose, Ur >=1000 (*)     Bilirubin Urine SMALL (*)     Ketones, Urine 40 (*)     Protein, UA TRACE (*)     All other components within normal limits    Narrative:     Performed at:  [facility] [address], [place]   Phone [phone]   MICROSCOPIC URINALYSIS - Abnormal; Notable for the following components:    Bacteria, UA 1+ (*)     RBC, UA 5 (*)     Epithelial Cells, UA 13 (*)     All other components within normal limits    Narrative:     Performed at:  [facility] [address], [place]   Phone [phone]   GASTROINTESTINAL PANEL, MOLECULAR   C DIFF TOXIN/ANTIGEN   LIPASE    Narrative:     Performed at:  [facility] [address], [place]   Phone [phone]   TROPONIN    Narrative:     Performed at:  [facility] [address], [place]   Phone [phone]       When ordered only abnormal lab results are displayed.  All ondansetron [facility]) injection 4 mg (4 mg IntraVENous Given 1/8/22 1731)   morphine injection 4 mg (4 mg IntraVENous Given 1/8/22 1732)   iopamidol (ISOVUE-370) 76 % injection 75 mL (75 mLs IntraVENous Given 1/8/22 1740)           This patient presents complaining of nausea, vomiting, diarrhea, abdominal pain, chest tightness and shortness of breath. CT scan of the abdomen shows ileus versus gastroenteritis. There is diverticulosis without evidence of diverticulitis. CT scan of the chest does not show PE or pneumonia. She is hyponatremic and appears dehydrated. I do feel admission is warranted for continued rehydration, pain and nausea control. Patient understands and agrees with plan. We will attempt to get stool sample to inquire further about diarrhea and potential infectious source such as c diff. FINAL IMPRESSION      1. Nausea vomiting and diarrhea    2. Abnormal abdominal CT scan    3. Shortness of breath    4. Dehydration    5. Hyponatremia          DISPOSITION/PLAN   DISPOSITION Decision To Admit 01/08/2022 06:56:26 PM      PATIENT REFERRED TO:  No follow-up provider specified.     DISCHARGE MEDICATIONS:  New Prescriptions    No medications on file       DISCONTINUED MEDICATIONS:  Discontinued Medications    No medications on file              (Please note that portions of this note were completed with a voice recognition program.  Efforts were made to edit the dictations but occasionally words are mis-transcribed.)    Shayna Benitez PA-C (electronically signed)           Shayna Benitez PA-C  01/08/22 8579

## 2022-01-08 NOTE — LETTER
[facility]  [place] [address] [address]  Phone: [phone]            January 13, 2022     Patient: Kristine Perez   YOB: 1950   Date of Visit: 1/8/2022       To Whom It May Concern: It is my medical opinion that Kristine Perez should remain out of work until Thursday 1/20/2022. If you have any questions or concerns, please don't hesitate to call.     Sincerely,        DERRICK Anders-CNP

## 2022-01-09 ENCOUNTER — APPOINTMENT (OUTPATIENT)
Dept: GENERAL RADIOLOGY | Age: 72
DRG: 392 | End: 2022-01-09
Payer: MEDICARE

## 2022-01-09 PROBLEM — R19.7 NAUSEA VOMITING AND DIARRHEA: Status: ACTIVE | Noted: 2022-01-08

## 2022-01-09 LAB
A/G RATIO: 1.3 (ref 1.1–2.2)
ALBUMIN SERPL-MCNC: 3.3 G/DL (ref 3.4–5)
ALP BLD-CCNC: 61 U/L (ref 40–129)
ALT SERPL-CCNC: 11 U/L (ref 10–40)
ANION GAP SERPL CALCULATED.3IONS-SCNC: 10 MMOL/L (ref 3–16)
AST SERPL-CCNC: 28 U/L (ref 15–37)
BASOPHILS ABSOLUTE: 0 K/UL (ref 0–0.2)
BASOPHILS RELATIVE PERCENT: 0.3 %
BILIRUB SERPL-MCNC: 0.3 MG/DL (ref 0–1)
BUN BLDV-MCNC: 13 MG/DL (ref 7–20)
C DIFF TOXIN/ANTIGEN: NORMAL
CALCIUM SERPL-MCNC: 8.3 MG/DL (ref 8.3–10.6)
CHLORIDE BLD-SCNC: 101 MMOL/L (ref 99–110)
CO2: 21 MMOL/L (ref 21–32)
CREAT SERPL-MCNC: 0.6 MG/DL (ref 0.6–1.2)
EOSINOPHILS ABSOLUTE: 0 K/UL (ref 0–0.6)
EOSINOPHILS RELATIVE PERCENT: 0.8 %
ESTIMATED AVERAGE GLUCOSE: 188.6 MG/DL
GFR AFRICAN AMERICAN: >60
GFR NON-AFRICAN AMERICAN: >60
GLUCOSE BLD-MCNC: 117 MG/DL (ref 70–99)
GLUCOSE BLD-MCNC: 124 MG/DL (ref 70–99)
GLUCOSE BLD-MCNC: 126 MG/DL (ref 70–99)
GLUCOSE BLD-MCNC: 153 MG/DL (ref 70–99)
GLUCOSE BLD-MCNC: 155 MG/DL (ref 70–99)
GLUCOSE BLD-MCNC: 167 MG/DL (ref 70–99)
GLUCOSE BLD-MCNC: 191 MG/DL (ref 70–99)
GLUCOSE BLD-MCNC: 98 MG/DL (ref 70–99)
HBA1C MFR BLD: 8.2 %
HCT VFR BLD CALC: 38 % (ref 36–48)
HEMOGLOBIN: 12.8 G/DL (ref 12–16)
LYMPHOCYTES ABSOLUTE: 0.6 K/UL (ref 1–5.1)
LYMPHOCYTES RELATIVE PERCENT: 11.9 %
MCH RBC QN AUTO: 29.2 PG (ref 26–34)
MCHC RBC AUTO-ENTMCNC: 33.7 G/DL (ref 31–36)
MCV RBC AUTO: 86.7 FL (ref 80–100)
MONOCYTES ABSOLUTE: 0.5 K/UL (ref 0–1.3)
MONOCYTES RELATIVE PERCENT: 11.8 %
NEUTROPHILS ABSOLUTE: 3.5 K/UL (ref 1.7–7.7)
NEUTROPHILS RELATIVE PERCENT: 75.2 %
OSMOLALITY URINE: 979 MOSM/KG (ref 390–1070)
OSMOLALITY: 285 MOSM/KG (ref 280–301)
PDW BLD-RTO: 14 % (ref 12.4–15.4)
PERFORMED ON: ABNORMAL
PERFORMED ON: NORMAL
PLATELET # BLD: 150 K/UL (ref 135–450)
PMV BLD AUTO: 8.2 FL (ref 5–10.5)
POTASSIUM REFLEX MAGNESIUM: 4.1 MMOL/L (ref 3.5–5.1)
RBC # BLD: 4.38 M/UL (ref 4–5.2)
SODIUM BLD-SCNC: 132 MMOL/L (ref 136–145)
SODIUM BLD-SCNC: 132 MMOL/L (ref 136–145)
SODIUM BLD-SCNC: 136 MMOL/L (ref 136–145)
SODIUM URINE: 42 MMOL/L
TOTAL PROTEIN: 5.9 G/DL (ref 6.4–8.2)
WBC # BLD: 4.6 K/UL (ref 4–11)

## 2022-01-09 PROCEDURE — 87449 NOS EACH ORGANISM AG IA: CPT

## 2022-01-09 PROCEDURE — G0378 HOSPITAL OBSERVATION PER HR: HCPCS

## 2022-01-09 PROCEDURE — 36415 COLL VENOUS BLD VENIPUNCTURE: CPT

## 2022-01-09 PROCEDURE — 6370000000 HC RX 637 (ALT 250 FOR IP): Performed by: INTERNAL MEDICINE

## 2022-01-09 PROCEDURE — 6370000000 HC RX 637 (ALT 250 FOR IP): Performed by: NURSE PRACTITIONER

## 2022-01-09 PROCEDURE — 80053 COMPREHEN METABOLIC PANEL: CPT

## 2022-01-09 PROCEDURE — 87040 BLOOD CULTURE FOR BACTERIA: CPT

## 2022-01-09 PROCEDURE — 74018 RADEX ABDOMEN 1 VIEW: CPT

## 2022-01-09 PROCEDURE — 99223 1ST HOSP IP/OBS HIGH 75: CPT | Performed by: INTERNAL MEDICINE

## 2022-01-09 PROCEDURE — 6360000002 HC RX W HCPCS: Performed by: INTERNAL MEDICINE

## 2022-01-09 PROCEDURE — 96376 TX/PRO/DX INJ SAME DRUG ADON: CPT

## 2022-01-09 PROCEDURE — 2580000003 HC RX 258: Performed by: INTERNAL MEDICINE

## 2022-01-09 PROCEDURE — 96372 THER/PROPH/DIAG INJ SC/IM: CPT

## 2022-01-09 PROCEDURE — 87505 NFCT AGENT DETECTION GI: CPT

## 2022-01-09 PROCEDURE — 85025 COMPLETE CBC W/AUTO DIFF WBC: CPT

## 2022-01-09 PROCEDURE — 87324 CLOSTRIDIUM AG IA: CPT

## 2022-01-09 PROCEDURE — 84295 ASSAY OF SERUM SODIUM: CPT

## 2022-01-09 RX ORDER — MORPHINE SULFATE 2 MG/ML
2 INJECTION, SOLUTION INTRAMUSCULAR; INTRAVENOUS ONCE
Status: COMPLETED | OUTPATIENT
Start: 2022-01-09 | End: 2022-01-09

## 2022-01-09 RX ORDER — METOPROLOL SUCCINATE 25 MG/1
25 TABLET, EXTENDED RELEASE ORAL NIGHTLY
Status: DISCONTINUED | OUTPATIENT
Start: 2022-01-09 | End: 2022-01-13 | Stop reason: HOSPADM

## 2022-01-09 RX ADMIN — ACETAMINOPHEN 650 MG: 325 TABLET ORAL at 01:08

## 2022-01-09 RX ADMIN — ONDANSETRON 4 MG: 2 INJECTION INTRAMUSCULAR; INTRAVENOUS at 14:29

## 2022-01-09 RX ADMIN — SODIUM CHLORIDE: 9 INJECTION, SOLUTION INTRAVENOUS at 06:45

## 2022-01-09 RX ADMIN — ACETAMINOPHEN 650 MG: 325 TABLET ORAL at 06:44

## 2022-01-09 RX ADMIN — Medication 10 ML: at 21:51

## 2022-01-09 RX ADMIN — ONDANSETRON 4 MG: 2 INJECTION INTRAMUSCULAR; INTRAVENOUS at 08:08

## 2022-01-09 RX ADMIN — Medication 10 ML: at 08:04

## 2022-01-09 RX ADMIN — ACETAMINOPHEN 650 MG: 325 TABLET ORAL at 23:14

## 2022-01-09 RX ADMIN — ENOXAPARIN SODIUM 40 MG: 40 INJECTION SUBCUTANEOUS at 08:12

## 2022-01-09 RX ADMIN — INSULIN LISPRO 1 UNITS: 100 INJECTION, SOLUTION INTRAVENOUS; SUBCUTANEOUS at 06:36

## 2022-01-09 RX ADMIN — CLOPIDOGREL BISULFATE 75 MG: 75 TABLET ORAL at 08:03

## 2022-01-09 RX ADMIN — LISINOPRIL 20 MG: 20 TABLET ORAL at 08:03

## 2022-01-09 RX ADMIN — MORPHINE SULFATE 2 MG: 2 INJECTION, SOLUTION INTRAMUSCULAR; INTRAVENOUS at 08:05

## 2022-01-09 RX ADMIN — ACETAMINOPHEN 650 MG: 325 TABLET ORAL at 16:35

## 2022-01-09 RX ADMIN — METOPROLOL SUCCINATE 25 MG: 25 TABLET, FILM COATED, EXTENDED RELEASE ORAL at 21:51

## 2022-01-09 RX ADMIN — INSULIN GLARGINE 12 UNITS: 100 INJECTION, SOLUTION SUBCUTANEOUS at 21:48

## 2022-01-09 ASSESSMENT — PAIN SCALES - GENERAL
PAINLEVEL_OUTOF10: 0
PAINLEVEL_OUTOF10: 6
PAINLEVEL_OUTOF10: 9
PAINLEVEL_OUTOF10: 7
PAINLEVEL_OUTOF10: 7
PAINLEVEL_OUTOF10: 9

## 2022-01-09 NOTE — PROGRESS NOTES
HOSPITALISTS PROGRESS NOTE    1/9/2022 9:30 AM        Name: Nicole Avila . Admitted: 1/8/2022  Primary Care Provider: Nydia Card (Tel: None)      CC: Nausea vomiting and diarrhea    Brief Course: This 70 y.o. female  with PMHx of hypertension, hyperlipidemia, CAD;on Plavix, diabetes and diverticulitis presented with 2 days of intractable nausea, vomiting, and diarrhea. Patient also reported low-grade fevers with T-max of 99 at home. Per patient's report, she has Hx of diverticulitis and abdominal pain felt like the same. Of note, patient was Tylenol was called early December.       Interval history:   Pt seen and examined today   Overnight events noted and interval ancillary notes  Low-grade fever of 99.3 last night, WBC trended down  X-ray abdomen this morning suggestive of ileus but patient is having bowel movements  C. difficile negative  Serum sodium 132 this morning  Pt endorsed chills and loose bowel movement   but denied any nausea, vomiting, cough, shortness of breath or chest pain,          Assessment & Plan:     Nausea, vomiting, diarrhea, fever and abdominal pain :  CT abdomen and pelvis repeat x-ray abdomen this morning showed showed diverticulosis without any evidence of definitive acute diverticulitis, so mostly fluid-filled and mildly distended small bowel without any evidence of obstruction; findings suggestive of mild ileus or gastroenteritis  X-ray abdomen this morning suggestive of ileus but patient is having bowel movement  GI on board: Appreciate the recs  Blood cultures ordered  GI PCR ordered. Stool negative for C. Difficile  Continue IV fluids and as needed antiemetics     Hypertension: continue home meds  Monitor BP closely     Hyponatremia: Likely secondary to inadequate and take: Improved  Monitor serum sodium closely     Type II DM;  Hold metformin, sliding scale insulin  Monitor blood glucose closely     Hx of CAD:Continue home dose plavix        DVT prophylaxis Lovenox   Code:Full Code  Diet: ADULT DIET; Clear Liquid    Disposition: PT OT ordered    Current Medications  metoprolol succinate (TOPROL XL) extended release tablet 25 mg, Nightly  0.9 % sodium chloride infusion, Continuous  clopidogrel (PLAVIX) tablet 75 mg, Daily  lisinopril (PRINIVIL;ZESTRIL) tablet 20 mg, Daily  sodium chloride flush 0.9 % injection 5-40 mL, 2 times per day  sodium chloride flush 0.9 % injection 10 mL, PRN  0.9 % sodium chloride infusion, PRN  enoxaparin (LOVENOX) injection 40 mg, Daily  ondansetron (ZOFRAN-ODT) disintegrating tablet 4 mg, Q8H PRN   Or  ondansetron (ZOFRAN) injection 4 mg, Q6H PRN  polyethylene glycol (GLYCOLAX) packet 17 g, Daily PRN  acetaminophen (TYLENOL) tablet 650 mg, Q6H PRN   Or  acetaminophen (TYLENOL) suppository 650 mg, Q6H PRN  potassium chloride 10 mEq/100 mL IVPB (Peripheral Line), PRN  magnesium sulfate 1000 mg in dextrose 5% 100 mL IVPB, PRN  sodium phosphate 12.69 mmol in dextrose 5 % 250 mL IVPB, PRN   Or  sodium phosphate 25.41 mmol in dextrose 5 % 250 mL IVPB, PRN  insulin lispro (1 Unit Dial) 0-6 Units, Q4H  glucose (GLUTOSE) 40 % oral gel 15 g, PRN  dextrose 50 % IV solution, PRN  glucagon (rDNA) injection 1 mg, PRN  dextrose 5 % solution, PRN  insulin glargine (LANTUS;BASAGLAR) injection pen 12 Units, Nightly        Objective:  BP (!) 121/58   Pulse 89   Temp 98.1 °F (36.7 °C) (Oral)   Resp 18   Wt 183 lb 11.2 oz (83.3 kg)   SpO2 95%   BMI 35.88 kg/m²     Intake/Output Summary (Last 24 hours) at 1/9/2022 0930  Last data filed at 1/8/2022 1912  Gross per 24 hour   Intake 1000 ml   Output --   Net 1000 ml      Wt Readings from Last 3 Encounters:   01/09/22 183 lb 11.2 oz (83.3 kg)   11/22/19 175 lb (79.4 kg)   06/24/19 173 lb (78.5 kg)       Physical Examination:   General appearance:  No apparent distress, appears stated age and cooperative. Eyes: Sclera clear.  Pupils equal.  ENT: Moist oral mucosa. Trachea midline, no adenopathy. Cardiovascular: Regular rhythm, normal S1, S2. No murmur. No edema in lower extremities  Respiratory:Not using accessory muscles. Good inspiratory effort. Clear to auscultation bilaterally, no wheeze or crackles. GI: Abdomen soft, no tenderness, not distended, normal bowel sounds  Musculoskeletal: normal ROM, No cyanosis in digits  Neurology: CN 2-12 grossly intact. No speech or motor deficits  Psych: Normal affect. Alert and oriented in time, place and person  Skin: Warm, dry, normal turgor    Labs and Tests:  CBC:   Recent Labs     01/08/22  1613 01/09/22  0640   WBC 9.9 4.6   HGB 14.4 12.8    150     BMP:    Recent Labs     01/08/22  1613 01/09/22  0035 01/09/22  0640   * 132* 132*   K 4.8  --  4.1   CL 95*  --  101   CO2 18*  --  21   BUN 20  --  13   CREATININE 0.6  --  0.6   GLUCOSE 245*  --  167*     Hepatic:   Recent Labs     01/08/22  1613 01/09/22  0640   AST 37 28   ALT 15 11   BILITOT 0.4 0.3   ALKPHOS 80 61     CT ABDOMEN PELVIS W IV CONTRAST Additional Contrast? None   Final Result      1. Some mostly fluid filled and mildly distended small bowel loops are seen   within the mid to left abdomen without definite transition point. This is   nonspecific but may represent a mild ileus or changes of gastroenteritis. 2.  Hypodensity of the liver parenchyma suggests fatty infiltration though   other forms of intrinsic liver disease are not excluded. No focal liver   lesion or bile duct dilation is noted. 3.  A normal appearing appendix is identified. 4.  Diverticulosis without evidence of definite acute diverticulitis. CT CHEST PULMONARY EMBOLISM W CONTRAST   Final Result   No evidence of pulmonary embolism or acute pulmonary abnormality. Moderate coronary artery calcifications. Suggest cardiology follow-up.          XR ABDOMEN (KUB) (SINGLE AP VIEW)    (Results Pending)       Problem List  Active

## 2022-01-09 NOTE — CONSULTS
GASTROENTEROLOGY INPATIENT CONSULTATION:        IDENTIFYING DATA/REASON FOR CONSULTATION   PATIENT:  Alba Rojas  MRN:  [id number]  ADMIT DATE: 1/8/2022  TIME OF EVALUATION: 1/9/2022 10:57 AM  HOSPITAL STAY:   LOS: 0 days     REASON FOR CONSULTATION:      HISTORY OF PRESENT ILLNESS   Alba Rojas is a 70 y.o. female who has a past history notable for DM, CAD, Diverticulitis, HTN, and DLD who presents with nausea, vomiting, and diarrhea. We have been consulted regarding nausea, vomiting, and diarrhea. Patient reports she was diagnosed with covid in early 12/21. She reports she hasn't felt right since that time. She reports starting on Friday she develops some muscle aches and thought she had covid again. She then develops significant nausea, vomiting, and diarrhea. She reports symptoms got worse so she presented to the ER. No sick contacts. No known spoiled foods. She reports colonoscopy 2 years ago at [facility]. No antibiotic use. She reports stomach feels bloated and distended. CT scan with fluid filled small bowel. Hypodensity in the liver. No diverticulitis. C.diff negative. Colonoscopy 7/19 at [facility].  TICs    PAST MEDICAL, SURGICAL, FAMILY, and SOCIAL HISTORY     Past Medical History:   Diagnosis Date    Diverticulitis     Hyperlipidemia     Hypertension      Past Surgical History:   Procedure Laterality Date    CORONARY ANGIOPLASTY WITH STENT PLACEMENT      OVARY REMOVAL  right    FOR OVARIAN CYSTS     Family History   Problem Relation Age of Onset    Heart Disease Mother      Social History     Socioeconomic History    Marital status:      Spouse name: None    Number of children: None    Years of education: None    Highest education level: None   Occupational History    None   Tobacco Use    Smoking status: Never Smoker    Smokeless tobacco: Never Used   Vaping Use    Vaping Use: Never used   Substance and Sexual Activity    Alcohol use: No    Drug use: No    Sexual activity: None   Other Topics Concern    None   Social History Narrative    None     Social Determinants of Health     Financial Resource Strain:     Difficulty of Paying Living Expenses: Not on file   Food Insecurity:     Worried About Running Out of Food in the Last Year: Not on file    Tessa of Food in the Last Year: Not on file   Transportation Needs:     Lack of Transportation (Medical): Not on file    Lack of Transportation (Non-Medical):  Not on file   Physical Activity:     Days of Exercise per Week: Not on file    Minutes of Exercise per Session: Not on file   Stress:     Feeling of Stress : Not on file   Social Connections:     Frequency of Communication with Friends and Family: Not on file    Frequency of Social Gatherings with Friends and Family: Not on file    Attends Worship Services: Not on file    Active Member of [address] [place] [address] or Organizations: Not on file    Attends Club or Organization Meetings: Not on file    Marital Status: Not on file   Intimate Partner Violence:     Fear of Current or Ex-Partner: Not on file    Emotionally Abused: Not on file    Physically Abused: Not on file    Sexually Abused: Not on file   Housing Stability:     Unable to Pay for Housing in the Last Year: Not on file    Number of [place] in the Last Year: Not on file    Unstable Housing in the Last Year: Not on file       MEDICATIONS   SCHEDULED:  metoprolol succinate, 25 mg, Nightly  clopidogrel, 75 mg, Daily  lisinopril, 20 mg, Daily  sodium chloride flush, 5-40 mL, 2 times per day  enoxaparin, 40 mg, Daily  insulin lispro, 0-6 Units, Q4H  insulin glargine, 0.15 Units/kg, Nightly      FLUIDS/DRIPS:     sodium chloride 125 mL/hr at 01/09/22 0645    sodium chloride      dextrose       PRNs: sodium chloride flush, 10 mL, PRN  sodium chloride, 25 mL, PRN  ondansetron, 4 mg, Q8H PRN   Or  ondansetron, 4 mg, Q6H PRN  polyethylene glycol, 17 g, Daily PRN  acetaminophen, 650 mg, Q6H PRN Or  acetaminophen, 650 mg, Q6H PRN  potassium chloride, 10 mEq, PRN  magnesium sulfate, 1,000 mg, PRN  sodium phosphate IVPB, 0.16 mmol/kg, PRN   Or  sodium phosphate IVPB, 0.32 mmol/kg, PRN  glucose, 15 g, PRN  dextrose, 12.5 g, PRN  glucagon (rDNA), 1 mg, PRN  dextrose, 100 mL/hr, PRN      ALLERGIES:  She [unfilled]    REVIEW OF SYSTEMS   A full 12 pt ROS is negative other than noted in HPI    PHYSICAL EXAM   [unfilled]   I/O last 3 completed shifts:   In: 1000 [IV Piggyback:1000]  Out: -   Oxygen Therapy:  @[id number])@  @[id number])@  @[id number])@    Physical Exam:  Gen: Resting in bed, NAD   CV: RRR no MRG   Pul: CTAB   Abd: Good bowel sounds throughout, no scars, soft, NT/ND, no masses, no HSM   Ext: No edema   Neuro: No asterixis   Skin: No jaundice, spider angiomas, hernández erythema    LABS AND IMAGING     Recent Results (from the past 24 hour(s))   CBC Auto Differential    Collection Time: 01/08/22  4:13 PM   Result Value Ref Range    WBC 9.9 4.0 - 11.0 K/uL    RBC 4.96 4.00 - 5.20 M/uL    Hemoglobin 14.4 12.0 - 16.0 g/dL    Hematocrit 42.2 36.0 - 48.0 %    MCV 85.1 80.0 - 100.0 fL    MCH 29.0 26.0 - 34.0 pg    MCHC 34.1 31.0 - 36.0 g/dL    RDW 13.6 12.4 - 15.4 %    Platelets 546 138 - 027 K/uL    MPV 8.4 5.0 - 10.5 fL    Neutrophils % 90.3 %    Lymphocytes % 4.4 %    Monocytes % 4.9 %    Eosinophils % 0.2 %    Basophils % 0.2 %    Neutrophils Absolute 8.9 (H) 1.7 - 7.7 K/uL    Lymphocytes Absolute 0.4 (L) 1.0 - 5.1 K/uL    Monocytes Absolute 0.5 0.0 - 1.3 K/uL    Eosinophils Absolute 0.0 0.0 - 0.6 K/uL    Basophils Absolute 0.0 0.0 - 0.2 K/uL   Comprehensive Metabolic Panel    Collection Time: 01/08/22  4:13 PM   Result Value Ref Range    Sodium 127 (L) 136 - 145 mmol/L    Potassium 4.8 3.5 - 5.1 mmol/L    Chloride 95 (L) 99 - 110 mmol/L    CO2 18 (L) 21 - 32 mmol/L    Anion Gap 14 3 - 16    Glucose 245 (H) 70 - 99 mg/dL    BUN 20 7 - 20 mg/dL    CREATININE 0.6 0.6 - 1.2 mg/dL    GFR Non-African American >60 >60    GFR African American >60 >60    Calcium 9.0 8.3 - 10.6 mg/dL    Total Protein 7.6 6.4 - 8.2 g/dL    Albumin 4.0 3.4 - 5.0 g/dL    Albumin/Globulin Ratio 1.1 1.1 - 2.2    Total Bilirubin 0.4 0.0 - 1.0 mg/dL    Alkaline Phosphatase 80 40 - 129 U/L    ALT 15 10 - 40 U/L    AST 37 15 - 37 U/L   Lipase    Collection Time: 01/08/22  4:13 PM   Result Value Ref Range    Lipase 20.0 13.0 - 60.0 U/L   Troponin    Collection Time: 01/08/22  4:13 PM   Result Value Ref Range    Troponin <0.01 <0.01 ng/mL   Brain Natriuretic Peptide    Collection Time: 01/08/22  4:13 PM   Result Value Ref Range    Pro- (H) 0 - 124 pg/mL   Osmolality    Collection Time: 01/08/22  4:13 PM   Result Value Ref Range    Osmolality 285 280 - 301 mOsm/kg   Urinalysis Reflex to Culture    Collection Time: 01/08/22  5:20 PM    Specimen: Urine, clean catch   Result Value Ref Range    Color, UA YELLOW Straw/Yellow    Clarity, UA CLOUDY (A) Clear    Glucose, Ur >=1000 (A) Negative mg/dL    Bilirubin Urine SMALL (A) Negative    Ketones, Urine 40 (A) Negative mg/dL    Specific Gravity, UA >1.030 1.005 - 1.030    Blood, Urine Negative Negative    pH, UA 5.5 5.0 - 8.0    Protein, UA TRACE (A) Negative mg/dL    Urobilinogen, Urine 1.0 <2.0 E.U./dL    Nitrite, Urine Negative Negative    Leukocyte Esterase, Urine Negative Negative    Microscopic Examination YES     Urine Type NotGiven     Urine Reflex to Culture Not Indicated    Microscopic Urinalysis    Collection Time: 01/08/22  5:20 PM   Result Value Ref Range    Bacteria, UA 1+ (A) None Seen /HPF    WBC, UA 4 0 - 5 /HPF    RBC, UA 5 (H) 0 - 4 /HPF    Epithelial Cells, UA 13 (H) 0 - 5 /HPF   Osmolality, urine    Collection Time: 01/08/22  5:20 PM   Result Value Ref Range    Osmolality, Ur 979 390 - 1070 mOsm/kg   Sodium, urine, random    Collection Time: 01/08/22  5:20 PM   Result Value Ref Range    Sodium, Ur 42 Not Established mmol/L   POCT Glucose Collection Time: 01/08/22 10:12 PM   Result Value Ref Range    POC Glucose 188 (H) 70 - 99 mg/dl    Performed on ACCU-CHEK    Clostridium Difficile Toxin/Antigen    Collection Time: 01/09/22 12:00 AM    Specimen: Stool   Result Value Ref Range    C.diff Toxin/Antigen       Negative for Clostridium difficile antigen and toxin  Normal Range: Negative     Sodium    Collection Time: 01/09/22 12:35 AM   Result Value Ref Range    Sodium 132 (L) 136 - 145 mmol/L   POCT Glucose    Collection Time: 01/09/22  2:36 AM   Result Value Ref Range    POC Glucose 191 (H) 70 - 99 mg/dl    Performed on ACCU-CHEK    POCT Glucose    Collection Time: 01/09/22  6:35 AM   Result Value Ref Range    POC Glucose 153 (H) 70 - 99 mg/dl    Performed on ACCU-CHEK    Comprehensive Metabolic Panel w/ Reflex to MG    Collection Time: 01/09/22  6:40 AM   Result Value Ref Range    Sodium 132 (L) 136 - 145 mmol/L    Potassium reflex Magnesium 4.1 3.5 - 5.1 mmol/L    Chloride 101 99 - 110 mmol/L    CO2 21 21 - 32 mmol/L    Anion Gap 10 3 - 16    Glucose 167 (H) 70 - 99 mg/dL    BUN 13 7 - 20 mg/dL    CREATININE 0.6 0.6 - 1.2 mg/dL    GFR Non-African American >60 >60    GFR African American >60 >60    Calcium 8.3 8.3 - 10.6 mg/dL    Total Protein 5.9 (L) 6.4 - 8.2 g/dL    Albumin 3.3 (L) 3.4 - 5.0 g/dL    Albumin/Globulin Ratio 1.3 1.1 - 2.2    Total Bilirubin 0.3 0.0 - 1.0 mg/dL    Alkaline Phosphatase 61 40 - 129 U/L    ALT 11 10 - 40 U/L    AST 28 15 - 37 U/L   CBC auto differential    Collection Time: 01/09/22  6:40 AM   Result Value Ref Range    WBC 4.6 4.0 - 11.0 K/uL    RBC 4.38 4.00 - 5.20 M/uL    Hemoglobin 12.8 12.0 - 16.0 g/dL    Hematocrit 38.0 36.0 - 48.0 %    MCV 86.7 80.0 - 100.0 fL    MCH 29.2 26.0 - 34.0 pg    MCHC 33.7 31.0 - 36.0 g/dL    RDW 14.0 12.4 - 15.4 %    Platelets 998 266 - 483 K/uL    MPV 8.2 5.0 - 10.5 fL    Neutrophils % 75.2 %    Lymphocytes % 11.9 %    Monocytes % 11.8 %    Eosinophils % 0.8 %    Basophils % 0.3 % Neutrophils Absolute 3.5 1.7 - 7.7 K/uL    Lymphocytes Absolute 0.6 (L) 1.0 - 5.1 K/uL    Monocytes Absolute 0.5 0.0 - 1.3 K/uL    Eosinophils Absolute 0.0 0.0 - 0.6 K/uL    Basophils Absolute 0.0 0.0 - 0.2 K/uL   POCT Glucose    Collection Time: 01/09/22  7:26 AM   Result Value Ref Range    POC Glucose 155 (H) 70 - 99 mg/dl    Performed on ACCU-CHEK      Other Labs      Imaging      ASSESSMENT AND RECOMMENDATIONS   Brenton Ryan is a 70 y.o. female who has a past history notable for DM, CAD, Diverticulitis, HTN, and DLD who presents with nausea, vomiting, and diarrhea. We have been consulted regarding nausea, vomiting, and diarrhea. IMPRESSION:    1. Nausea/vomiting  2. Diarrhea     RECOMMENDATIONS:    Etiology unclear. C.diff negative. Stool studies pending. ? Viral gastroenteritis. CT with no colitis and KUB show ileus but having BM/diarrhea. No diverticulitis. GB normal appearing. Labs normal appearing with normal CBC, LFT, Lipase. Continue supportive care with IV fluids and antiemetics. Consider additional work-up if symptoms persist.     If you have any questions or need any further information, please feel free to contact our consult team.  Thank you for allowing us to participate in the care of Brenton Ryan.     Deepika Capone MD  [id number] [facility]

## 2022-01-09 NOTE — ED NOTES
Pt report given to 3A RN by Corey Brewster RN, states no question s or concerns at this time. Pt transported to floor via wheelchair by Alvaro Xie with portable telemetry on and normal saline still infusing at time of transport.       [address], RN  01/08/22 9968

## 2022-01-09 NOTE — H&P
HOSPITALISTS HISTORY AND PHYSICAL    1/8/2022 8:47 PM    Patient Information:  González Da Silva is a 70 y.o. female [id number]  PCP:  Ricki Mathew (Tel: None )    Chief complaint:    Chief Complaint   Patient presents with    Emesis     Pt reports emesis, and abdominal pain, HX diverticulitis         History of Present Illness:  Cheyenne Hancock is a 70 y.o. female coronary artery disease on Plavix, diabetes, and hyperlipidemia. Patient presents the emergency room for 2 days of intractable nausea, vomiting, and diarrhea. Reports temp at home of 99.0. She is afebrile in the emergency room. Patient reports she has had at least 10 loose stools today and yesterday. Is been unable to keep any food down. She does report diffuse abdominal pain in her lower abdomen. States 10 out of 10 at its worse. She did receive pain medicine in the emergency room the pain has greatly improved. She denies any urinary frequency or dysuria. She has had prior diverticulitis and felt this pain was similar. She was diagnosed with Covid early December. Her symptoms included cough and headache. Symptoms have improved. She was on azithromycin at that time. History obtained from patient       REVIEW OF SYSTEMS:   Review of Systems   Constitutional: Positive for activity change, appetite change and fatigue. HENT: Negative. Eyes: Negative. Respiratory: Positive for cough. Negative for shortness of breath. Cardiovascular: Negative. Gastrointestinal: Positive for abdominal pain, diarrhea, nausea and vomiting. Genitourinary: Negative. Musculoskeletal: Negative. Skin: Negative. Neurological: Negative. Hematological: Negative. Psychiatric/Behavioral: Negative. All other systems reviewed and are negative.       Past Medical History:   has a past medical history of Diverticulitis, Hyperlipidemia, and Hypertension. Past Surgical History:   has a past surgical history that includes Ovary removal (right) and Coronary angioplasty with stent. Medications:  No current facility-administered medications on file prior to encounter. Current Outpatient Medications on File Prior to Encounter   Medication Sig Dispense Refill    metFORMIN (GLUCOPHAGE) 500 MG tablet Take 500 mg by mouth 2 times daily (with meals)      METOPROLOL SUCCINATE PO Take by mouth      clopidogrel (PLAVIX) 75 MG tablet Take 75 mg by mouth daily      lisinopril-hydrochlorothiazide (PRINZIDE;ZESTORETIC) 20-12.5 MG per tablet Take 1 tablet by mouth daily. Allergies:  No Known Allergies     Social History:  Patient Lives grandson   reports that she has never smoked. She has never used smokeless tobacco. She reports that she does not drink alcohol and does not use drugs. Family History:  family history includes Heart Disease in her mother. ,    Physical Exam:  BP (!) 181/81   Pulse 87   Temp 98.4 °F (36.9 °C)   Resp 27   Wt 175 lb (79.4 kg)   SpO2 98%   BMI 34.18 kg/m²   Physical Exam  Vitals and nursing note reviewed. Constitutional:       Appearance: Normal appearance. HENT:      Head: Normocephalic. Nose: Nose normal.      Mouth/Throat:      Mouth: Mucous membranes are moist.   Eyes:      Pupils: Pupils are equal, round, and reactive to light. Cardiovascular:      Rate and Rhythm: Normal rate and regular rhythm. Pulses: Normal pulses. Heart sounds: No murmur heard. Pulmonary:      Effort: Pulmonary effort is normal. No respiratory distress. Breath sounds: Normal breath sounds. Abdominal:      General: Abdomen is flat. Bowel sounds are normal. There is no distension. Palpations: Abdomen is soft. Tenderness: There is abdominal tenderness. Comments: Diffuse tenderness especially on left upper and lower abdomen    Musculoskeletal:         General: Normal range of motion. Cervical back: Normal range of motion. Right lower leg: No edema. Left lower leg: No edema. Skin:     General: Skin is warm and dry. Capillary Refill: Capillary refill takes less than 2 seconds. Neurological:      General: No focal deficit present. Mental Status: She is alert and oriented to person, place, and time. Psychiatric:         Mood and Affect: Mood normal.         Behavior: Behavior normal.         Labs:  CBC:   Lab Results   Component Value Date    WBC 9.9 01/08/2022    RBC 4.96 01/08/2022    HGB 14.4 01/08/2022    HCT 42.2 01/08/2022    MCV 85.1 01/08/2022    MCH 29.0 01/08/2022    MCHC 34.1 01/08/2022    RDW 13.6 01/08/2022     01/08/2022    MPV 8.4 01/08/2022     BMP:    Lab Results   Component Value Date     01/08/2022    K 4.8 01/08/2022    K 4.4 11/22/2019    CL 95 01/08/2022    CO2 18 01/08/2022    BUN 20 01/08/2022    CREATININE 0.6 01/08/2022    CALCIUM 9.0 01/08/2022    GFRAA >60 01/08/2022    GFRAA >60 01/06/2013    LABGLOM >60 01/08/2022    GLUCOSE 245 01/08/2022     CT ABDOMEN PELVIS W IV CONTRAST Additional Contrast? None   Final Result      1. Some mostly fluid filled and mildly distended small bowel loops are seen   within the mid to left abdomen without definite transition point. This is   nonspecific but may represent a mild ileus or changes of gastroenteritis. 2.  Hypodensity of the liver parenchyma suggests fatty infiltration though   other forms of intrinsic liver disease are not excluded. No focal liver   lesion or bile duct dilation is noted. 3.  A normal appearing appendix is identified. 4.  Diverticulosis without evidence of definite acute diverticulitis. CT CHEST PULMONARY EMBOLISM W CONTRAST   Final Result   No evidence of pulmonary embolism or acute pulmonary abnormality. Moderate coronary artery calcifications. Suggest cardiology follow-up.            Chest Xray:   EKG:    I visualized CXR images and EKG strips    Problem List  Active Problems:    * No active hospital problems. *  Resolved Problems:    * No resolved hospital problems. *        Assessment/Plan:   1. Intractable N/V/D and Acute Abdominal Pain  Patient will be admitted observation. She will be placed on IV fluids overnight  As needed Zofran for nausea  We will check stool studies and C. Difficile currently patient is afebrile and white blood cell count is normal.  There is been no reported blood in her stool. 2. Gastroenteritis vs Mild Ileus  Patient has had over 20 loose stools in past 48 hours, feel symptoms more related to gastroenteritis over illeus. We will give IV fluid. We will start her on a clear liquid diet and consider advancing in the morning if she tolerates. Stool for C. difficile and cx    3. Hypertension  BP stable continue home meds    4. Hyponatremia  Corrected Na 130  Glucose 245    5. Type II DM  Hold metformin, sliding scale insulin    6. CAD  Continue home dose plavix      DVT prophylaxis Lovenox   Code status Full   Diet Clear liquid  IV access peripheral   Watson Catheter none    Admit as Observation. I anticipate hospitalization spanning less than two midnights for investigation and treatment of the above medically necessary diagnoses. Please note that some part of this chart was generated using Dragon dictation software. Although every effort was made to ensure the accuracy of this automated transcription, some errors in transcription may have occurred inadvertently. If you may need any clarification, please do not hesitate to contact me through [facility].        DERRICK Arellano CNP    1/8/2022 8:47 PM

## 2022-01-09 NOTE — CONSULTS
Office : [phone]     Fax :[phone]       Nephrology Consult Note      Patient's Name: Bianca Samuels  1:26 PM  1/9/2022    Reason for Consult:  Hyponatremia       Requesting Physician:  Dave Kearney      Chief Complaint:    Chief Complaint   Patient presents with    Emesis     Pt reports emesis, and abdominal pain, HX diverticulitis          History of Present iIlness:    Bianca Samuels is a 70 y.o. female with female coronary artery disease on Plavix, diabetes, and hyperlipidemia. Patient presents the emergency room for 2 days of intractable nausea, vomiting, and diarrhea. Reports temp at home of 99.0. She is afebrile in the emergency room. Patient reports she has had at least 10 loose stools today and yesterday. Is been unable to keep any food down. She does report diffuse abdominal pain in her lower abdomen. States 10 out of 10 at its worse. She did receive pain medicine in the emergency room the pain has greatly improved. She denies any urinary frequency or dysuria. She has had prior diverticulitis and felt this pain was similar.     She was diagnosed with Covid early December. Her symptoms included cough and headache. Symptoms have improved. She was on azithromycin at that time. Initial lab work showed sodium of 127   HCO3 level  18     C/o chills   Has cough with no sputum     BP stable       I/O last 3 completed shifts:   In: 1000 [IV Piggyback:1000]  Out: -     Past Medical History:   Diagnosis Date    Diverticulitis     Hyperlipidemia     Hypertension        Past Surgical History:   Procedure Laterality Date    CORONARY ANGIOPLASTY WITH STENT PLACEMENT      OVARY REMOVAL  right    FOR OVARIAN CYSTS       Family History   Problem Relation Age of Onset    Heart Disease Mother         reports that she has never smoked. She has never used smokeless tobacco. She reports that she does not drink alcohol and does not use drugs. Allergies:  Patient has no known allergies.     Current Medications:    metoprolol succinate (TOPROL XL) extended release tablet 25 mg, Nightly  0.9 % sodium chloride infusion, Continuous  clopidogrel (PLAVIX) tablet 75 mg, Daily  lisinopril (PRINIVIL;ZESTRIL) tablet 20 mg, Daily  sodium chloride flush 0.9 % injection 5-40 mL, 2 times per day  sodium chloride flush 0.9 % injection 10 mL, PRN  0.9 % sodium chloride infusion, PRN  enoxaparin (LOVENOX) injection 40 mg, Daily  ondansetron (ZOFRAN-ODT) disintegrating tablet 4 mg, Q8H PRN   Or  ondansetron (ZOFRAN) injection 4 mg, Q6H PRN  polyethylene glycol (GLYCOLAX) packet 17 g, Daily PRN  acetaminophen (TYLENOL) tablet 650 mg, Q6H PRN   Or  acetaminophen (TYLENOL) suppository 650 mg, Q6H PRN  potassium chloride 10 mEq/100 mL IVPB (Peripheral Line), PRN  magnesium sulfate 1000 mg in dextrose 5% 100 mL IVPB, PRN  sodium phosphate 12.69 mmol in dextrose 5 % 250 mL IVPB, PRN   Or  sodium phosphate 25.41 mmol in dextrose 5 % 250 mL IVPB, PRN  insulin lispro (1 Unit Dial) 0-6 Units, Q4H  glucose (GLUTOSE) 40 % oral gel 15 g, PRN  dextrose 50 % IV solution, PRN  glucagon (rDNA) injection 1 mg, PRN  dextrose 5 % solution, PRN  insulin glargine (LANTUS;BASAGLAR) injection pen 12 Units, Nightly        Review of Systems:   14 point ROS obtained but were negative except mentioned in HPI      Physical exam:     Vitals:  /70   Pulse 73   Temp 97.8 °F (36.6 °C) (Axillary)   Resp 18   Wt 183 lb 11.2 oz (83.3 kg)   SpO2 97%   BMI 35.88 kg/m²   Constitutional:  OAA X3 NAD  Skin: no rash, turgor wnl  Heent:  eomi, mmm  Neck: no bruits or jvd noted  Cardiovascular:  S1, S2 without m/r/g  Respiratory: CTA B without w/r/r  Abdomen:  +bs, soft, nt, nd  Ext: no lower extremity edema  Psychiatric: mood and affect appropriate  Musculoskeletal:  Rom, muscular strength intact    Labs:  CBC:   Recent Labs     01/08/22  1613 01/09/22  0640   WBC 9.9 4.6   HGB 14.4 12.8    150     BMP:    Recent Labs     01/08/22  1613 01/08/22  1613 01/09/22  0035 01/09/22  0640 01/09/22  1106   *   < > 132* 132* 136   K 4.8  --   --  4.1  --    CL 95*  --   --  101  --    CO2 18*  --   --  21  --    BUN 20  --   --  13  --    CREATININE 0.6  --   --  0.6  --    GLUCOSE 245*  --   --  167*  --     < > = values in this interval not displayed. Ca/Mg/Phos:   Recent Labs     01/08/22  1613 01/09/22  0640   CALCIUM 9.0 8.3     Hepatic:   Recent Labs     01/08/22  1613 01/09/22  0640   AST 37 28   ALT 15 11   BILITOT 0.4 0.3   ALKPHOS 80 61     Troponin:   Recent Labs     01/08/22  1613   TROPONINI <0.01     BNP: No results for input(s): BNP in the last 72 hours. Lipids: No results for input(s): CHOL, TRIG, HDL, LDLCALC, LABVLDL in the last 72 hours. ABGs: No results for input(s): PHART, PO2ART, WKQ6XIH in the last 72 hours. INR: No results for input(s): INR in the last 72 hours. UA:  Recent Labs     01/08/22  1720   COLORU YELLOW   CLARITYU CLOUDY*   GLUCOSEU >=1000*   BILIRUBINUR SMALL*   KETUA 40*   SPECGRAV >1.030   BLOODU Negative   PHUR 5.5   PROTEINU TRACE*   UROBILINOGEN 1.0   NITRU Negative   LEUKOCYTESUR Negative   LABMICR YES   URINETYPE NotGiven      Urine Microscopic:   Recent Labs     01/08/22  1720   BACTERIA 1+*   WBCUA 4   RBCUA 5*   EPIU 13*     Urine Culture: No results for input(s): LABURIN in the last 72 hours. Urine Chemistry:   Recent Labs     01/08/22  1720   NAUR 42                IMAGING:  XR ABDOMEN (KUB) (SINGLE AP VIEW)   Final Result   Several mildly distended loops of small bowel within the left upper quadrant,   corresponding to the findings on the previous CT scan, suggestive of an ileus. CT ABDOMEN PELVIS W IV CONTRAST Additional Contrast? None   Final Result      1.   Some mostly fluid filled and mildly distended small bowel loops are seen   within the mid to left abdomen without definite transition point. This is   nonspecific but may represent a mild ileus or changes of gastroenteritis. 2.  Hypodensity of the liver parenchyma suggests fatty infiltration though   other forms of intrinsic liver disease are not excluded. No focal liver   lesion or bile duct dilation is noted. 3.  A normal appearing appendix is identified. 4.  Diverticulosis without evidence of definite acute diverticulitis. CT CHEST PULMONARY EMBOLISM W CONTRAST   Final Result   No evidence of pulmonary embolism or acute pulmonary abnormality. Moderate coronary artery calcifications. Suggest cardiology follow-up. Assessment/Plan :      1. Hyponatremia 2/2 hypovolemia   Sodium corrected for BS around 130   Improved with iv fluids   Better so will decrease rate of iv fluids to 75 ml/ hr   Check in am now       2. HTN. Controlled on lisinopril     3. Metabolic acidosis . Improved     4. Nausea/ vomiting \has chills   ?  Diverticulitis   Management per primary team           D/w primary team      Thank you for allowing us to participate in care of [facility]         Electronically signed by: Joann Gutierrez MD, 1/9/2022, 1:26 PM      [facility] [address]  Office : [phone]  Fax :[phone]

## 2022-01-10 PROBLEM — K52.9 GASTROENTERITIS: Status: ACTIVE | Noted: 2022-01-10

## 2022-01-10 LAB
A/G RATIO: 1.3 (ref 1.1–2.2)
ALBUMIN SERPL-MCNC: 3.4 G/DL (ref 3.4–5)
ALP BLD-CCNC: 59 U/L (ref 40–129)
ALT SERPL-CCNC: 11 U/L (ref 10–40)
ANION GAP SERPL CALCULATED.3IONS-SCNC: 11 MMOL/L (ref 3–16)
AST SERPL-CCNC: 19 U/L (ref 15–37)
BASOPHILS ABSOLUTE: 0 K/UL (ref 0–0.2)
BASOPHILS RELATIVE PERCENT: 0.2 %
BILIRUB SERPL-MCNC: 0.3 MG/DL (ref 0–1)
BUN BLDV-MCNC: 8 MG/DL (ref 7–20)
CALCIUM SERPL-MCNC: 8.6 MG/DL (ref 8.3–10.6)
CHLORIDE BLD-SCNC: 103 MMOL/L (ref 99–110)
CO2: 23 MMOL/L (ref 21–32)
CREAT SERPL-MCNC: 0.6 MG/DL (ref 0.6–1.2)
EOSINOPHILS ABSOLUTE: 0 K/UL (ref 0–0.6)
EOSINOPHILS RELATIVE PERCENT: 0.6 %
GFR AFRICAN AMERICAN: >60
GFR NON-AFRICAN AMERICAN: >60
GLUCOSE BLD-MCNC: 100 MG/DL (ref 70–99)
GLUCOSE BLD-MCNC: 101 MG/DL (ref 70–99)
GLUCOSE BLD-MCNC: 101 MG/DL (ref 70–99)
GLUCOSE BLD-MCNC: 102 MG/DL (ref 70–99)
GLUCOSE BLD-MCNC: 106 MG/DL (ref 70–99)
GLUCOSE BLD-MCNC: 135 MG/DL (ref 70–99)
GLUCOSE BLD-MCNC: 95 MG/DL (ref 70–99)
HCT VFR BLD CALC: 36.4 % (ref 36–48)
HEMOGLOBIN: 12.4 G/DL (ref 12–16)
LYMPHOCYTES ABSOLUTE: 0.8 K/UL (ref 1–5.1)
LYMPHOCYTES RELATIVE PERCENT: 19.7 %
MCH RBC QN AUTO: 29.3 PG (ref 26–34)
MCHC RBC AUTO-ENTMCNC: 34 G/DL (ref 31–36)
MCV RBC AUTO: 86.3 FL (ref 80–100)
MONOCYTES ABSOLUTE: 0.6 K/UL (ref 0–1.3)
MONOCYTES RELATIVE PERCENT: 14.8 %
NEUTROPHILS ABSOLUTE: 2.8 K/UL (ref 1.7–7.7)
NEUTROPHILS RELATIVE PERCENT: 64.7 %
PDW BLD-RTO: 13.4 % (ref 12.4–15.4)
PERFORMED ON: ABNORMAL
PERFORMED ON: NORMAL
PLATELET # BLD: 157 K/UL (ref 135–450)
PMV BLD AUTO: 8.1 FL (ref 5–10.5)
POTASSIUM REFLEX MAGNESIUM: 3.8 MMOL/L (ref 3.5–5.1)
RBC # BLD: 4.21 M/UL (ref 4–5.2)
SARS-COV-2, NAAT: NOT DETECTED
SODIUM BLD-SCNC: 137 MMOL/L (ref 136–145)
TOTAL PROTEIN: 6 G/DL (ref 6.4–8.2)
WBC # BLD: 4.3 K/UL (ref 4–11)

## 2022-01-10 PROCEDURE — 87324 CLOSTRIDIUM AG IA: CPT

## 2022-01-10 PROCEDURE — 97161 PT EVAL LOW COMPLEX 20 MIN: CPT

## 2022-01-10 PROCEDURE — 1200000000 HC SEMI PRIVATE

## 2022-01-10 PROCEDURE — 87449 NOS EACH ORGANISM AG IA: CPT

## 2022-01-10 PROCEDURE — 99232 SBSQ HOSP IP/OBS MODERATE 35: CPT | Performed by: INTERNAL MEDICINE

## 2022-01-10 PROCEDURE — 2580000003 HC RX 258: Performed by: INTERNAL MEDICINE

## 2022-01-10 PROCEDURE — 85025 COMPLETE CBC W/AUTO DIFF WBC: CPT

## 2022-01-10 PROCEDURE — 87505 NFCT AGENT DETECTION GI: CPT

## 2022-01-10 PROCEDURE — 6370000000 HC RX 637 (ALT 250 FOR IP): Performed by: NURSE PRACTITIONER

## 2022-01-10 PROCEDURE — 36415 COLL VENOUS BLD VENIPUNCTURE: CPT

## 2022-01-10 PROCEDURE — 6360000002 HC RX W HCPCS: Performed by: INTERNAL MEDICINE

## 2022-01-10 PROCEDURE — 96372 THER/PROPH/DIAG INJ SC/IM: CPT

## 2022-01-10 PROCEDURE — 87635 SARS-COV-2 COVID-19 AMP PRB: CPT

## 2022-01-10 PROCEDURE — 97530 THERAPEUTIC ACTIVITIES: CPT

## 2022-01-10 PROCEDURE — 6370000000 HC RX 637 (ALT 250 FOR IP): Performed by: INTERNAL MEDICINE

## 2022-01-10 PROCEDURE — 87328 CRYPTOSPORIDIUM AG IA: CPT

## 2022-01-10 PROCEDURE — 6370000000 HC RX 637 (ALT 250 FOR IP): Performed by: PHYSICIAN ASSISTANT

## 2022-01-10 PROCEDURE — 87336 ENTAMOEB HIST DISPR AG IA: CPT

## 2022-01-10 PROCEDURE — 80053 COMPREHEN METABOLIC PANEL: CPT

## 2022-01-10 RX ORDER — LIDOCAINE HYDROCHLORIDE 10 MG/ML
5 INJECTION, SOLUTION EPIDURAL; INFILTRATION; INTRACAUDAL; PERINEURAL ONCE
Status: DISCONTINUED | OUTPATIENT
Start: 2022-01-10 | End: 2022-01-13 | Stop reason: HOSPADM

## 2022-01-10 RX ORDER — SODIUM CHLORIDE 9 MG/ML
25 INJECTION, SOLUTION INTRAVENOUS PRN
Status: DISCONTINUED | OUTPATIENT
Start: 2022-01-10 | End: 2022-01-13 | Stop reason: HOSPADM

## 2022-01-10 RX ORDER — DICYCLOMINE HYDROCHLORIDE 10 MG/1
10 CAPSULE ORAL 4 TIMES DAILY PRN
Status: DISCONTINUED | OUTPATIENT
Start: 2022-01-10 | End: 2022-01-13 | Stop reason: HOSPADM

## 2022-01-10 RX ORDER — SODIUM CHLORIDE 0.9 % (FLUSH) 0.9 %
5-40 SYRINGE (ML) INJECTION EVERY 12 HOURS SCHEDULED
Status: DISCONTINUED | OUTPATIENT
Start: 2022-01-10 | End: 2022-01-13 | Stop reason: HOSPADM

## 2022-01-10 RX ORDER — SODIUM CHLORIDE 0.9 % (FLUSH) 0.9 %
5-40 SYRINGE (ML) INJECTION PRN
Status: DISCONTINUED | OUTPATIENT
Start: 2022-01-10 | End: 2022-01-13 | Stop reason: HOSPADM

## 2022-01-10 RX ORDER — PANTOPRAZOLE SODIUM 40 MG/1
40 TABLET, DELAYED RELEASE ORAL
Status: DISCONTINUED | OUTPATIENT
Start: 2022-01-10 | End: 2022-01-11

## 2022-01-10 RX ADMIN — CLOPIDOGREL BISULFATE 75 MG: 75 TABLET ORAL at 07:56

## 2022-01-10 RX ADMIN — DICYCLOMINE HYDROCHLORIDE 10 MG: 10 CAPSULE ORAL at 21:08

## 2022-01-10 RX ADMIN — METOPROLOL SUCCINATE 25 MG: 25 TABLET, FILM COATED, EXTENDED RELEASE ORAL at 20:42

## 2022-01-10 RX ADMIN — ACETAMINOPHEN 650 MG: 325 TABLET ORAL at 12:47

## 2022-01-10 RX ADMIN — INSULIN GLARGINE 12 UNITS: 100 INJECTION, SOLUTION SUBCUTANEOUS at 20:43

## 2022-01-10 RX ADMIN — LISINOPRIL 20 MG: 20 TABLET ORAL at 07:56

## 2022-01-10 RX ADMIN — ENOXAPARIN SODIUM 40 MG: 40 INJECTION SUBCUTANEOUS at 07:55

## 2022-01-10 RX ADMIN — SODIUM CHLORIDE: 9 INJECTION, SOLUTION INTRAVENOUS at 10:06

## 2022-01-10 RX ADMIN — ACETAMINOPHEN 650 MG: 325 TABLET ORAL at 22:35

## 2022-01-10 RX ADMIN — Medication 10 ML: at 11:17

## 2022-01-10 RX ADMIN — Medication 10 ML: at 20:44

## 2022-01-10 ASSESSMENT — PAIN SCALES - GENERAL
PAINLEVEL_OUTOF10: 7
PAINLEVEL_OUTOF10: 4
PAINLEVEL_OUTOF10: 0

## 2022-01-10 ASSESSMENT — PAIN DESCRIPTION - PAIN TYPE: TYPE: ACUTE PAIN

## 2022-01-10 ASSESSMENT — PAIN DESCRIPTION - LOCATION: LOCATION: ABDOMEN

## 2022-01-10 NOTE — PROGRESS NOTES
Office : [phone]     Fax :[phone]       Nephrology progress  Note      Patient's Name: Nicole Avila  8:51 AM  1/10/2022    Reason for Consult:  Hyponatremia       Requesting Physician:  Nydia Card      Chief Complaint:    Chief Complaint   Patient presents with    Emesis     Pt reports emesis, and abdominal pain, HX diverticulitis          History of Present iIlness:    Nicole Avila is a 70 y.o. female with female coronary artery disease on Plavix, diabetes, and hyperlipidemia. Patient presents the emergency room for 2 days of intractable nausea, vomiting, and diarrhea. Reports temp at home of 99.0. She is afebrile in the emergency room. Patient reports she has had at least 10 loose stools today and yesterday. Is been unable to keep any food down. She does report diffuse abdominal pain in her lower abdomen. States 10 out of 10 at its worse. She did receive pain medicine in the emergency room the pain has greatly improved. She denies any urinary frequency or dysuria. She has had prior diverticulitis and felt this pain was similar.     She was diagnosed with Covid early December. Her symptoms included cough and headache. Symptoms have improved. She was on azithromycin at that time. Initial lab work showed sodium of 127   HCO3 level  18     C/o chills   Has cough with no sputum     BP stable     Interval hx :    No fever   Nausea     Sodium level better     I/O last 3 completed shifts:   In: 1120 [P.O.:120; IV Piggyback:1000]  Out: 450 [Urine:450]    Past Medical History:   Diagnosis Date    Diverticulitis     Hyperlipidemia     Hypertension        Past Surgical History:   Procedure Laterality Date    CORONARY ANGIOPLASTY WITH STENT PLACEMENT      OVARY REMOVAL  right    FOR OVARIAN CYSTS         Current Medications:    metoprolol succinate (TOPROL XL) extended release tablet 25 mg, Nightly  0.9 % sodium chloride infusion, Continuous  clopidogrel (PLAVIX) tablet 75 mg, Daily  lisinopril (PRINIVIL;ZESTRIL) tablet 20 mg, Daily  sodium chloride flush 0.9 % injection 5-40 mL, 2 times per day  sodium chloride flush 0.9 % injection 10 mL, PRN  0.9 % sodium chloride infusion, PRN  enoxaparin (LOVENOX) injection 40 mg, Daily  ondansetron (ZOFRAN-ODT) disintegrating tablet 4 mg, Q8H PRN   Or  ondansetron (ZOFRAN) injection 4 mg, Q6H PRN  polyethylene glycol (GLYCOLAX) packet 17 g, Daily PRN  acetaminophen (TYLENOL) tablet 650 mg, Q6H PRN   Or  acetaminophen (TYLENOL) suppository 650 mg, Q6H PRN  potassium chloride 10 mEq/100 mL IVPB (Peripheral Line), PRN  magnesium sulfate 1000 mg in dextrose 5% 100 mL IVPB, PRN  sodium phosphate 12.69 mmol in dextrose 5 % 250 mL IVPB, PRN   Or  sodium phosphate 25.41 mmol in dextrose 5 % 250 mL IVPB, PRN  insulin lispro (1 Unit Dial) 0-6 Units, Q4H  glucose (GLUTOSE) 40 % oral gel 15 g, PRN  dextrose 50 % IV solution, PRN  glucagon (rDNA) injection 1 mg, PRN  dextrose 5 % solution, PRN  insulin glargine (LANTUS;BASAGLAR) injection pen 12 Units, Nightly            Physical exam:     Vitals:  BP (!) 144/75   Pulse 78   Temp 99.2 °F (37.3 °C) (Oral)   Resp 18   Wt 176 lb (79.8 kg)   SpO2 95%   BMI 34.37 kg/m²   Constitutional:  OAA X3 NAD  Skin: no rash, turgor wnl  Heent:  eomi, mmm  Neck: no bruits or jvd noted  Cardiovascular:  S1, S2 without m/r/g  Respiratory: CTA B without w/r/r  Abdomen:  +bs, soft, nt, nd  Ext: no lower extremity edema  Psychiatric: mood and affect appropriate  Musculoskeletal:  Rom, muscular strength intact    Labs:  CBC:   Recent Labs     01/08/22  1613 01/09/22  0640 01/10/22  0422   WBC 9.9 4.6 4.3   HGB 14.4 12.8 12.4    150 157     BMP:    Recent Labs     01/08/22  1613 01/09/22  0035 01/09/22  0640 01/09/22  1106 01/10/22  0422   *   < > 132* 136 137   K 4.8  --  4.1  --  3.8   CL 95*  --  101  --  103   CO2 18*  --  21  --  23   BUN 20  --  13  --  8   CREATININE 0.6  --  0.6  --  0.6   GLUCOSE 245*  --  167*  --  101*    < > = values in this interval not displayed. Ca/Mg/Phos:   Recent Labs     01/08/22  1613 01/09/22  0640 01/10/22  0422   CALCIUM 9.0 8.3 8.6     Hepatic:   Recent Labs     01/08/22  1613 01/09/22  0640 01/10/22  0422   AST 37 28 19   ALT 15 11 11   BILITOT 0.4 0.3 0.3   ALKPHOS 80 61 59     Troponin:   Recent Labs     01/08/22  1613   TROPONINI <0.01     BNP: No results for input(s): BNP in the last 72 hours. Lipids: No results for input(s): CHOL, TRIG, HDL, LDLCALC, LABVLDL in the last 72 hours. ABGs: No results for input(s): PHART, PO2ART, KWC1ULV in the last 72 hours. INR: No results for input(s): INR in the last 72 hours. UA:  Recent Labs     01/08/22  1720   COLORU YELLOW   CLARITYU CLOUDY*   GLUCOSEU >=1000*   BILIRUBINUR SMALL*   KETUA 40*   SPECGRAV >1.030   BLOODU Negative   PHUR 5.5   PROTEINU TRACE*   UROBILINOGEN 1.0   NITRU Negative   LEUKOCYTESUR Negative   LABMICR YES   URINETYPE NotGiven      Urine Microscopic:   Recent Labs     01/08/22  1720   BACTERIA 1+*   WBCUA 4   RBCUA 5*   EPIU 13*     Urine Culture: No results for input(s): LABURIN in the last 72 hours. Urine Chemistry:   Recent Labs     01/08/22  1720   NAUR 42                IMAGING:  XR ABDOMEN (KUB) (SINGLE AP VIEW)   Final Result   Several mildly distended loops of small bowel within the left upper quadrant,   corresponding to the findings on the previous CT scan, suggestive of an ileus. CT ABDOMEN PELVIS W IV CONTRAST Additional Contrast? None   Final Result      1. Some mostly fluid filled and mildly distended small bowel loops are seen   within the mid to left abdomen without definite transition point.   This is   nonspecific but may represent a mild ileus or changes of gastroenteritis. 2.  Hypodensity of the liver parenchyma suggests fatty infiltration though   other forms of intrinsic liver disease are not excluded. No focal liver   lesion or bile duct dilation is noted. 3.  A normal appearing appendix is identified. 4.  Diverticulosis without evidence of definite acute diverticulitis. CT CHEST PULMONARY EMBOLISM W CONTRAST   Final Result   No evidence of pulmonary embolism or acute pulmonary abnormality. Moderate coronary artery calcifications. Suggest cardiology follow-up. Assessment/Plan :      1. Hyponatremia 2/2 hypovolemia   Sodium level better   Improved with iv fluids   Continue iv fluids at 75 ml/ hr        2. HTN. Controlled on lisinopril     3. Metabolic acidosis . Improved     4. Nausea/ vomiting \has chills   ?  Diverticulitis   GI following           D/w primary team      Thank you for allowing us to participate in care of Mahi Salomon         Electronically signed by: Christian Riddle MD, 1/10/2022, 8:51 AM      [facility] [address]  Office : [phone]  Fax :[phone]

## 2022-01-10 NOTE — PROGRESS NOTES
Gastroenterology Progress Note    Justino Mai is a 70 y.o. female patient. 1. Nausea vomiting and diarrhea    2. Abnormal abdominal CT scan    3. Shortness of breath    4. Dehydration    5. Hyponatremia        SUBJECTIVE: Pt sitting at the edge of bed working with PT. Reports fullness and tenderness in lower abd. ROS:  Cardiovascular ROS: no chest pain or dyspnea on exertion  Gastrointestinal ROS: see hpi  Respiratory ROS: no cough, shortness of breath, or wheezing    Physical    VITALS:  BP (!) 151/82   Pulse 76   Temp 99.5 °F (37.5 °C) (Oral)   Resp 16   Wt 176 lb (79.8 kg)   SpO2 96%   BMI 34.37 kg/m²   TEMPERATURE:  Current - Temp: 99.5 °F (37.5 °C); Max - Temp  Av.1 °F (37.3 °C)  Min: 98.2 °F (36.8 °C)  Max: 99.5 °F (37.5 °C)    NAD  RRR, Nl s1s2  Lungs CTA Bilaterally, normal effort  Abdomen soft, ND, NT, no HSM, Bowel sounds normal  AAOx3, No asterixis     Data    Data Review:    Recent Labs     22  1613 22  0640 01/10/22  0422   WBC 9.9 4.6 4.3   HGB 14.4 12.8 12.4   HCT 42.2 38.0 36.4   MCV 85.1 86.7 86.3    150 157     Recent Labs     22  1613 22  0035 22  0640 22  1106 01/10/22  0422   *   < > 132* 136 137   K 4.8  --  4.1  --  3.8   CL 95*  --  101  --  103   CO2 18*  --  21  --  23   BUN 20  --  13  --  8   CREATININE 0.6  --  0.6  --  0.6    < > = values in this interval not displayed. Recent Labs     22  1613 22  0640 01/10/22  0422   AST 37 28 19   ALT 15 11 11   BILITOT 0.4 0.3 0.3   ALKPHOS 80 61 59     Recent Labs     22  1613   LIPASE 20.0     No results for input(s): PROTIME, INR in the last 72 hours. No results for input(s): PTT in the last 72 hours.     Radiology Review:      CT Abdomen Pelvis with IV contrast      1.  Some mostly fluid filled and mildly distended small bowel loops are seen   within the mid to left abdomen without definite transition point.  This is   nonspecific but may represent a mild ileus or changes of gastroenteritis.       2.  Hypodensity of the liver parenchyma suggests fatty infiltration though   other forms of intrinsic liver disease are not excluded. No focal liver   lesion or bile duct dilation is noted.       3.  A normal appearing appendix is identified.       4.  Diverticulosis without evidence of definite acute diverticulitis. ASSESSMENT   Nausea/Vomiting: No nausea or vomiting since admission. Diarrhea: No diarrhea sinus admission, reports having difficulty passing stool now. Reports pain in lower abd. C-dff is negative, other stool studies pending. Suspect pt had a gastroenteritis. PLAN  -Clear liquid and advance diet once improved    -Continue supportive care     Yogi Earl   NP student     I have personally performed a face to face diagnostic evaluation on this patient. I have interviewed and examined the patient and I agree with the findings and recommended plan of care. In summary, my findings and plan are the following: Pt with some nausea but no vomiting. Tolerated some clear liquids. Not much of an appetite. Reports lower abdominal pain which she has had for a month and is planning to see Dr Ella Perez for this. reprots many year h/o constipation followed by diarrhea along with abdominal bloating. Last colonoscopy in 2019. Suspect acute gastroenteritis causing acute issues. She likely has IBS. Advance diet to full liquids and ADAT from there. F/u stool EIA and GI pathogens. If she fails to improve, consider EGD.    Is a very pleasant 70-year-old female who was seen at the bedside today she was seen with our certified physicians assistant and our NP student  This patient has been having problems with nausea vomiting diarrhea    She had a CT scan done on the eighth  This did show that there is some fluid-filled mildly distended small bowel loop with no transition  Not sure if this is an ileus or some    She is continued not to feel well and has nausea today    She also feels like she wants to go to the bathroom but there is a nothing there to come out  The patient had a temperature of 101.4 overnight but since then no further fevers over 100    Plan would be to do an upper endoscopic evaluation tomorrow  To see if we can figure out why she has so much nausea vomiting    Thank you for this kind referral      Discussed with Dr. Jose Luis Chisholm, [address] [place] [address]

## 2022-01-10 NOTE — PROGRESS NOTES
PIV access lost last night. Per nightshift RN, 4 RNs attempted to regain access. DIT called during nightshift and the RN was told this was not priority and could be taken care of this morning by day shift DIT/PICC. PICC RN called this morning to attempt PIV.

## 2022-01-10 NOTE — PROGRESS NOTES
Occupational [facility]    Per patient and physical therapy, no OT needs identified. If status changes please reorder. Discharge from OT caseload at this time. Thank you,   Maday Mccartney.  Kameron Me[address]ia [address]

## 2022-01-10 NOTE — PROGRESS NOTES
HOSPITALISTS PROGRESS NOTE    1/10/2022 1:33 PM        Name: Katy Parada Admitted: 1/8/2022  Primary Care Provider: Michael Parish (Tel: None)      CC: Nausea vomiting and diarrhea    Brief Course: This 70 y.o. female  with PMHx of hypertension, hyperlipidemia, CAD;on Plavix, diabetes and diverticulitis presented with 2 days of intractable nausea, vomiting, and diarrhea. Patient also reported low-grade fevers with T-max of 99 at home. Per patient's report, she has Hx of diverticulitis and abdominal pain felt like the same. Of note, patient was Tylenol was called early December.       Interval history:   Pt seen and examined today. Overnight events noted, interval ancillary notes and labs reviewed. Afebrile overnight, WBC within normal limits  Tolerating liquid diet; advance diet as tolerated  Continues to complain of loose bowel movement and lower abdominal pain  C. difficile negative      Assessment & Plan:     Nausea, vomiting, diarrhea, fever and abdominal pain :  CT abdomen and pelvis repeat x-ray abdomen this morning showed showed diverticulosis without any evidence of definitive acute diverticulitis, so mostly fluid-filled and mildly distended small bowel without any evidence of obstruction; findings suggestive of mild ileus or gastroenteritis  X-ray abdomen this morning suggestive of ileus but patient is having bowel movement  GI on board: Appreciate the recs  Blood cultures no growth to date  GI PCR ordered. Stool negative for C. Difficile  Continue IV fluids and as needed antiemetics     Hypertension: continue home meds  Monitor BP closely     Hyponatremia: Likely secondary to inadequate and take: Resolved  Monitor serum sodium closely     Type II DM;  Hold metformin, sliding scale insulin  Monitor blood glucose closely     Hx of CAD:Continue home dose plavix        DVT prophylaxis Lovenox   Code:Full Code  Diet: ADULT DIET; Full Liquid    Disposition: PT OT ordered    Current Medications  lidocaine PF 1 % injection 5 mL, Once  sodium chloride flush 0.9 % injection 5-40 mL, 2 times per day  sodium chloride flush 0.9 % injection 5-40 mL, PRN  0.9 % sodium chloride infusion, PRN  metoprolol succinate (TOPROL XL) extended release tablet 25 mg, Nightly  0.9 % sodium chloride infusion, Continuous  clopidogrel (PLAVIX) tablet 75 mg, Daily  lisinopril (PRINIVIL;ZESTRIL) tablet 20 mg, Daily  sodium chloride flush 0.9 % injection 5-40 mL, 2 times per day  sodium chloride flush 0.9 % injection 10 mL, PRN  0.9 % sodium chloride infusion, PRN  enoxaparin (LOVENOX) injection 40 mg, Daily  ondansetron (ZOFRAN-ODT) disintegrating tablet 4 mg, Q8H PRN   Or  ondansetron (ZOFRAN) injection 4 mg, Q6H PRN  polyethylene glycol (GLYCOLAX) packet 17 g, Daily PRN  acetaminophen (TYLENOL) tablet 650 mg, Q6H PRN   Or  acetaminophen (TYLENOL) suppository 650 mg, Q6H PRN  potassium chloride 10 mEq/100 mL IVPB (Peripheral Line), PRN  magnesium sulfate 1000 mg in dextrose 5% 100 mL IVPB, PRN  sodium phosphate 12.69 mmol in dextrose 5 % 250 mL IVPB, PRN   Or  sodium phosphate 25.41 mmol in dextrose 5 % 250 mL IVPB, PRN  insulin lispro (1 Unit Dial) 0-6 Units, Q4H  glucose (GLUTOSE) 40 % oral gel 15 g, PRN  dextrose 50 % IV solution, PRN  glucagon (rDNA) injection 1 mg, PRN  dextrose 5 % solution, PRN  insulin glargine (LANTUS;BASAGLAR) injection pen 12 Units, Nightly        Objective:  BP (!) 151/82   Pulse 76   Temp 99.5 °F (37.5 °C) (Oral)   Resp 16   Wt 176 lb (79.8 kg)   SpO2 96%   BMI 34.37 kg/m²     Intake/Output Summary (Last 24 hours) at 1/10/2022 1333  Last data filed at 1/9/2022 2130  Gross per 24 hour   Intake 120 ml   Output 450 ml   Net -330 ml      Wt Readings from Last 3 Encounters:   01/10/22 176 lb (79.8 kg)   11/22/19 175 lb (79.4 kg)   06/24/19 173 lb (78.5 kg)       Physical Examination:   General appearance:  No apparent distress, appears stated age and cooperative. Eyes: Sclera clear. Pupils equal.  ENT: Moist oral mucosa. Trachea midline, no adenopathy. Cardiovascular: Regular rhythm, normal S1, S2. No murmur. No edema in lower extremities  Respiratory:Not using accessory muscles. Good inspiratory effort. Clear to auscultation bilaterally, no wheeze or crackles. GI: Abdomen soft, no tenderness, not distended, normal bowel sounds  Musculoskeletal: normal ROM, No cyanosis in digits  Neurology: CN 2-12 grossly intact. No speech or motor deficits  Psych: Normal affect. Alert and oriented in time, place and person  Skin: Warm, dry, normal turgor    Labs and Tests:  CBC:   Recent Labs     01/08/22  1613 01/09/22  0640 01/10/22  0422   WBC 9.9 4.6 4.3   HGB 14.4 12.8 12.4    150 157     BMP:    Recent Labs     01/08/22  1613 01/09/22  0035 01/09/22  0640 01/09/22  1106 01/10/22  0422   *   < > 132* 136 137   K 4.8  --  4.1  --  3.8   CL 95*  --  101  --  103   CO2 18*  --  21  --  23   BUN 20  --  13  --  8   CREATININE 0.6  --  0.6  --  0.6   GLUCOSE 245*  --  167*  --  101*    < > = values in this interval not displayed. Hepatic:   Recent Labs     01/08/22  1613 01/09/22  0640 01/10/22  0422   AST 37 28 19   ALT 15 11 11   BILITOT 0.4 0.3 0.3   ALKPHOS 80 61 59     XR ABDOMEN (KUB) (SINGLE AP VIEW)   Final Result   Several mildly distended loops of small bowel within the left upper quadrant,   corresponding to the findings on the previous CT scan, suggestive of an ileus. CT ABDOMEN PELVIS W IV CONTRAST Additional Contrast? None   Final Result      1. Some mostly fluid filled and mildly distended small bowel loops are seen   within the mid to left abdomen without definite transition point. This is   nonspecific but may represent a mild ileus or changes of gastroenteritis. 2.  Hypodensity of the liver parenchyma suggests fatty infiltration though   other forms of intrinsic liver disease are not excluded.  No focal liver   lesion or bile duct dilation is noted. 3.  A normal appearing appendix is identified. 4.  Diverticulosis without evidence of definite acute diverticulitis. CT CHEST PULMONARY EMBOLISM W CONTRAST   Final Result   No evidence of pulmonary embolism or acute pulmonary abnormality. Moderate coronary artery calcifications. Suggest cardiology follow-up. Problem List  Active Problems:    Nausea vomiting and diarrhea    Hyponatremia    Gastroenteritis  Resolved Problems:    * No resolved hospital problems.  Víctor Wright MD   1/10/2022 1:33 PM

## 2022-01-10 NOTE — ACP (ADVANCE CARE PLANNING)
Advance Care Planning Note       Purpose of Encounter: Advanced care planning in light of hospitalization for nausea, vomiting and diarrhea  Parties in attendance: :Shaniqua Ramirez MD, Family members  Decisional Capacity:Yes  Objective: Admitted for gastroenteritis  Goals of Care Determinations: Patient wants full support including CPR, intubation, trach, PEG tube, dialysis   Code Status: Full  Time Spent on Advanced Planning Documents: 18 mins. Advanced Care Planning Documents:   Completed advances directives, advanced directives in chart.       Electronically signed by Grace Clark MD on 1/10/2022 at 6:18 PM

## 2022-01-10 NOTE — PROGRESS NOTES
Physical Therapy    Facility/Department: [address] NURSING  Initial Assessment/Discharge summary    NAME: Naveed Eddy  : 1950  MRN: [id number]    Date of Service: 1/10/2022    Discharge Recommendations  Naveed Eddy scored a 23/24 on the AM-PAC short mobility form. At this time, no further PT is recommended upon discharge due to independence with functional mobility. Recommend patient returns to prior setting with prior services. PT Equipment Recommendations  Equipment Needed: No  Assessment   Body structures, Functions, Activity limitations: Increased pain;Decreased functional mobility ; Decreased posture;Decreased ADL status  Assessment: pt demonstrated independence with functional mobility, no skilled therapy needs  Prognosis: Good  Decision Making: Low Complexity  PT Education: Goals;PT Role;Plan of Care;General Safety  Patient Education: pt verbalized understanding  REQUIRES PT FOLLOW UP: Yes  Activity Tolerance  Activity Tolerance: Patient Tolerated treatment well;Patient limited by pain       Patient Diagnosis(es): The primary encounter diagnosis was Nausea vomiting and diarrhea. Diagnoses of Abnormal abdominal CT scan, Shortness of breath, Dehydration, and Hyponatremia were also pertinent to this visit. has a past medical history of Diverticulitis, Hyperlipidemia, and Hypertension. has a past surgical history that includes Ovary removal (right) and Coronary angioplasty with stent. Restrictions  Restrictions/Precautions  Restrictions/Precautions: Up as Tolerated  Required Braces or Orthoses?: No  Position Activity Restriction  Other position/activity restrictions: Naveed Eddy is a 70 y.o. female who presents to the emergency department with the primary chief complaint of abdominal pain, nausea, vomiting and diarrhea for the past few days. She has history of diverticulitis and this feels similar.   She rates her pain to be a 9 out of 10 on pain scale without radiation of symptoms. Denies bloody or black stool. Denies bloody, bilious or coffee-ground emesis     Vision/Hearing  Vision: Impaired  Vision Exceptions: Wears glasses at all times  Hearing: Within functional limits       Subjective  General  Chart Reviewed: Yes  Family / Caregiver Present: No  Diagnosis: Shortness of breath, dehydration,hyponatremia, abnormal abdominal CT, nausea, vomiting, diarrhea, intractable nausea and vomiting.   Follows Commands: Within Functional Limits  Subjective  Subjective: Patient supine in bed upon PT arrival.  Pain Screening  Patient Currently in Pain: Yes  Pain Assessment  Pain Assessment: 0-10  Pain Level: 7  Pain Type: Acute pain  Pain Location: Abdomen  Vital Signs  BP: (!) 162/69  BP Location: Right upper arm  Patient Position: Sitting  Patient Currently in Pain: Yes     Orientation  Orientation  Overall Orientation Status: Within Normal Limits     Social/Functional History  Social/Functional History  Lives With: Family  Type of Home: House  Home Layout: Two level  Home Access: Stairs to enter with rails  Entrance Stairs - Number of Steps: 12  Entrance Stairs - Rails: Left  Bathroom Shower/Tub: Tub/Shower unit  Bathroom Toilet: Standard  Bathroom Equipment: Grab bars in shower  ADL Assistance: Independent  Homemaking Assistance: Independent  Homemaking Responsibilities: Yes  Ambulation Assistance: Independent  Transfer Assistance: Independent  Active : Yes  Mode of Transportation: Car  Occupation: Full time employment  Type of occupation:      Cognition   Cognition  Overall Cognitive Status: WNL    Objective     Observation/Palpation  Posture: Good    AROM RLE (degrees)  RLE AROM: WFL  AROM LLE (degrees)  LLE AROM : WFL  Strength RLE  Strength RLE: WFL  Strength LLE  Strength LLE: WFL        Bed mobility  Bridging: Independent  Rolling to Left: Independent  Rolling to Right: Independent  Supine to Sit: Independent  Sit to Supine: Independent  Scooting: Independent  Transfers  Sit to Stand: Supervision  Stand to sit: Supervision  Ambulation  Ambulation?: Yes  WB Status: WBAT  Ambulation 1  Surface: level tile  Device: No Device  Assistance: Supervision  Gait Deviations: Slow Stella  Distance: 2x12'  Stairs/Curb  Stairs?: No     Balance  Posture: Good  Sitting - Static: Good;+  Sitting - Dynamic: Good;+  Standing - Static: Good;+  Standing - Dynamic: Good  Comments: EOB balance with supervision--pt having difficulty moving outside of LAUREN secondary to abdominal pain      Plan   Safety Devices  Type of devices: Call light within reach,Gait belt,Left in bed,All fall risk precautions in place,Nurse notified  Restraints  Initially in place: No    AM-PAC Score  AM-PAC Inpatient Mobility Raw Score : 23 (01/10/22 1121)  AM-PAC Inpatient T-Scale Score : 56.93 (01/10/22 1121)  Mobility Inpatient CMS 0-100% Score: 11.2 (01/10/22 1121)  Mobility Inpatient CMS G-Code Modifier : CI (01/10/22 1121)     Goals        Therapy Time   Individual Concurrent Group Co-treatment   Time In 1009         Time Out 1051         Minutes 42         Timed Code Treatment Minutes: 27 Minutes       Richard Flanagan [facility]  PT observed and directed the above evaluation/treatment.   [address], DPT [id number]

## 2022-01-11 ENCOUNTER — ANESTHESIA EVENT (OUTPATIENT)
Dept: ENDOSCOPY | Age: 72
DRG: 392 | End: 2022-01-11
Payer: MEDICARE

## 2022-01-11 ENCOUNTER — ANESTHESIA (OUTPATIENT)
Dept: ENDOSCOPY | Age: 72
DRG: 392 | End: 2022-01-11
Payer: MEDICARE

## 2022-01-11 VITALS
RESPIRATION RATE: 2 BRPM | DIASTOLIC BLOOD PRESSURE: 68 MMHG | SYSTOLIC BLOOD PRESSURE: 156 MMHG | OXYGEN SATURATION: 100 %

## 2022-01-11 LAB
A/G RATIO: 1.2 (ref 1.1–2.2)
ALBUMIN SERPL-MCNC: 3.4 G/DL (ref 3.4–5)
ALP BLD-CCNC: 61 U/L (ref 40–129)
ALT SERPL-CCNC: 9 U/L (ref 10–40)
ANION GAP SERPL CALCULATED.3IONS-SCNC: 10 MMOL/L (ref 3–16)
AST SERPL-CCNC: 16 U/L (ref 15–37)
BASOPHILS ABSOLUTE: 0 K/UL (ref 0–0.2)
BASOPHILS RELATIVE PERCENT: 0.5 %
BILIRUB SERPL-MCNC: 0.3 MG/DL (ref 0–1)
BUN BLDV-MCNC: 6 MG/DL (ref 7–20)
C DIFF TOXIN/ANTIGEN: NORMAL
CALCIUM SERPL-MCNC: 8.9 MG/DL (ref 8.3–10.6)
CHLORIDE BLD-SCNC: 105 MMOL/L (ref 99–110)
CO2: 26 MMOL/L (ref 21–32)
CREAT SERPL-MCNC: 0.6 MG/DL (ref 0.6–1.2)
EOSINOPHILS ABSOLUTE: 0 K/UL (ref 0–0.6)
EOSINOPHILS RELATIVE PERCENT: 0.9 %
GFR AFRICAN AMERICAN: >60
GFR NON-AFRICAN AMERICAN: >60
GI BACTERIAL PATHOGENS BY PCR: NORMAL
GLUCOSE BLD-MCNC: 106 MG/DL (ref 70–99)
GLUCOSE BLD-MCNC: 108 MG/DL (ref 70–99)
GLUCOSE BLD-MCNC: 156 MG/DL (ref 70–99)
GLUCOSE BLD-MCNC: 157 MG/DL (ref 70–99)
GLUCOSE BLD-MCNC: 83 MG/DL (ref 70–99)
GLUCOSE BLD-MCNC: 99 MG/DL (ref 70–99)
HCT VFR BLD CALC: 35.8 % (ref 36–48)
HEMOGLOBIN: 12.3 G/DL (ref 12–16)
LYMPHOCYTES ABSOLUTE: 0.9 K/UL (ref 1–5.1)
LYMPHOCYTES RELATIVE PERCENT: 25 %
MCH RBC QN AUTO: 29.3 PG (ref 26–34)
MCHC RBC AUTO-ENTMCNC: 34.2 G/DL (ref 31–36)
MCV RBC AUTO: 85.7 FL (ref 80–100)
MONOCYTES ABSOLUTE: 0.5 K/UL (ref 0–1.3)
MONOCYTES RELATIVE PERCENT: 14.5 %
NEUTROPHILS ABSOLUTE: 2 K/UL (ref 1.7–7.7)
NEUTROPHILS RELATIVE PERCENT: 59.1 %
PDW BLD-RTO: 13.8 % (ref 12.4–15.4)
PERFORMED ON: ABNORMAL
PERFORMED ON: NORMAL
PERFORMED ON: NORMAL
PLATELET # BLD: 155 K/UL (ref 135–450)
PMV BLD AUTO: 7.9 FL (ref 5–10.5)
POTASSIUM REFLEX MAGNESIUM: 3.8 MMOL/L (ref 3.5–5.1)
RBC # BLD: 4.18 M/UL (ref 4–5.2)
SODIUM BLD-SCNC: 141 MMOL/L (ref 136–145)
TOTAL PROTEIN: 6.2 G/DL (ref 6.4–8.2)
WBC # BLD: 3.5 K/UL (ref 4–11)

## 2022-01-11 PROCEDURE — 6370000000 HC RX 637 (ALT 250 FOR IP): Performed by: NURSE PRACTITIONER

## 2022-01-11 PROCEDURE — 3700000000 HC ANESTHESIA ATTENDED CARE: Performed by: INTERNAL MEDICINE

## 2022-01-11 PROCEDURE — 2580000003 HC RX 258: Performed by: NURSE ANESTHETIST, CERTIFIED REGISTERED

## 2022-01-11 PROCEDURE — 3700000001 HC ADD 15 MINUTES (ANESTHESIA): Performed by: INTERNAL MEDICINE

## 2022-01-11 PROCEDURE — 0DB68ZX EXCISION OF STOMACH, VIA NATURAL OR ARTIFICIAL OPENING ENDOSCOPIC, DIAGNOSTIC: ICD-10-PCS | Performed by: INTERNAL MEDICINE

## 2022-01-11 PROCEDURE — 2580000003 HC RX 258: Performed by: INTERNAL MEDICINE

## 2022-01-11 PROCEDURE — 88305 TISSUE EXAM BY PATHOLOGIST: CPT

## 2022-01-11 PROCEDURE — 6370000000 HC RX 637 (ALT 250 FOR IP): Performed by: PHYSICIAN ASSISTANT

## 2022-01-11 PROCEDURE — 0DB58ZX EXCISION OF ESOPHAGUS, VIA NATURAL OR ARTIFICIAL OPENING ENDOSCOPIC, DIAGNOSTIC: ICD-10-PCS | Performed by: INTERNAL MEDICINE

## 2022-01-11 PROCEDURE — 6370000000 HC RX 637 (ALT 250 FOR IP): Performed by: INTERNAL MEDICINE

## 2022-01-11 PROCEDURE — 6360000002 HC RX W HCPCS: Performed by: NURSE ANESTHETIST, CERTIFIED REGISTERED

## 2022-01-11 PROCEDURE — 80053 COMPREHEN METABOLIC PANEL: CPT

## 2022-01-11 PROCEDURE — 0DB98ZX EXCISION OF DUODENUM, VIA NATURAL OR ARTIFICIAL OPENING ENDOSCOPIC, DIAGNOSTIC: ICD-10-PCS | Performed by: INTERNAL MEDICINE

## 2022-01-11 PROCEDURE — 2500000003 HC RX 250 WO HCPCS: Performed by: NURSE ANESTHETIST, CERTIFIED REGISTERED

## 2022-01-11 PROCEDURE — 1200000000 HC SEMI PRIVATE

## 2022-01-11 PROCEDURE — 7100000000 HC PACU RECOVERY - FIRST 15 MIN: Performed by: INTERNAL MEDICINE

## 2022-01-11 PROCEDURE — 3609012400 HC EGD TRANSORAL BIOPSY SINGLE/MULTIPLE: Performed by: INTERNAL MEDICINE

## 2022-01-11 PROCEDURE — 99232 SBSQ HOSP IP/OBS MODERATE 35: CPT | Performed by: INTERNAL MEDICINE

## 2022-01-11 PROCEDURE — 36415 COLL VENOUS BLD VENIPUNCTURE: CPT

## 2022-01-11 PROCEDURE — 2709999900 HC NON-CHARGEABLE SUPPLY: Performed by: INTERNAL MEDICINE

## 2022-01-11 PROCEDURE — 85025 COMPLETE CBC W/AUTO DIFF WBC: CPT

## 2022-01-11 PROCEDURE — 7100000001 HC PACU RECOVERY - ADDTL 15 MIN: Performed by: INTERNAL MEDICINE

## 2022-01-11 RX ORDER — ONDANSETRON 2 MG/ML
4 INJECTION INTRAMUSCULAR; INTRAVENOUS
Status: DISCONTINUED | OUTPATIENT
Start: 2022-01-11 | End: 2022-01-11 | Stop reason: HOSPADM

## 2022-01-11 RX ORDER — PANTOPRAZOLE SODIUM 40 MG/1
40 TABLET, DELAYED RELEASE ORAL 2 TIMES DAILY
Status: DISCONTINUED | OUTPATIENT
Start: 2022-01-11 | End: 2022-01-13 | Stop reason: HOSPADM

## 2022-01-11 RX ORDER — SODIUM CHLORIDE 9 MG/ML
INJECTION, SOLUTION INTRAVENOUS CONTINUOUS PRN
Status: DISCONTINUED | OUTPATIENT
Start: 2022-01-11 | End: 2022-01-11 | Stop reason: SDUPTHER

## 2022-01-11 RX ORDER — FENTANYL CITRATE 50 UG/ML
25 INJECTION, SOLUTION INTRAMUSCULAR; INTRAVENOUS EVERY 5 MIN PRN
Status: DISCONTINUED | OUTPATIENT
Start: 2022-01-11 | End: 2022-01-11 | Stop reason: HOSPADM

## 2022-01-11 RX ORDER — LIDOCAINE HYDROCHLORIDE 10 MG/ML
1 INJECTION, SOLUTION EPIDURAL; INFILTRATION; INTRACAUDAL; PERINEURAL
Status: ACTIVE | OUTPATIENT
Start: 2022-01-11 | End: 2022-01-11

## 2022-01-11 RX ORDER — PROPOFOL 10 MG/ML
INJECTION, EMULSION INTRAVENOUS PRN
Status: DISCONTINUED | OUTPATIENT
Start: 2022-01-11 | End: 2022-01-11 | Stop reason: SDUPTHER

## 2022-01-11 RX ORDER — SODIUM CHLORIDE 9 MG/ML
INJECTION, SOLUTION INTRAVENOUS CONTINUOUS
Status: DISCONTINUED | OUTPATIENT
Start: 2022-01-11 | End: 2022-01-13

## 2022-01-11 RX ORDER — HYDROMORPHONE HCL 110MG/55ML
0.5 PATIENT CONTROLLED ANALGESIA SYRINGE INTRAVENOUS EVERY 5 MIN PRN
Status: DISCONTINUED | OUTPATIENT
Start: 2022-01-11 | End: 2022-01-11 | Stop reason: HOSPADM

## 2022-01-11 RX ORDER — PROMETHAZINE HYDROCHLORIDE 25 MG/ML
6.25 INJECTION, SOLUTION INTRAMUSCULAR; INTRAVENOUS
Status: DISCONTINUED | OUTPATIENT
Start: 2022-01-11 | End: 2022-01-11 | Stop reason: HOSPADM

## 2022-01-11 RX ORDER — FENTANYL CITRATE 50 UG/ML
50 INJECTION, SOLUTION INTRAMUSCULAR; INTRAVENOUS EVERY 5 MIN PRN
Status: DISCONTINUED | OUTPATIENT
Start: 2022-01-11 | End: 2022-01-11 | Stop reason: HOSPADM

## 2022-01-11 RX ORDER — LIDOCAINE HYDROCHLORIDE 20 MG/ML
INJECTION, SOLUTION EPIDURAL; INFILTRATION; INTRACAUDAL; PERINEURAL PRN
Status: DISCONTINUED | OUTPATIENT
Start: 2022-01-11 | End: 2022-01-11 | Stop reason: SDUPTHER

## 2022-01-11 RX ORDER — SODIUM CHLORIDE 9 MG/ML
1000 INJECTION, SOLUTION INTRAVENOUS CONTINUOUS
Status: ACTIVE | OUTPATIENT
Start: 2022-01-11 | End: 2022-01-12

## 2022-01-11 RX ORDER — HYDROCODONE BITARTRATE AND ACETAMINOPHEN 5; 325 MG/1; MG/1
1 TABLET ORAL
Status: DISCONTINUED | OUTPATIENT
Start: 2022-01-11 | End: 2022-01-11 | Stop reason: HOSPADM

## 2022-01-11 RX ORDER — INSULIN LISPRO 100 [IU]/ML
0-6 INJECTION, SOLUTION INTRAVENOUS; SUBCUTANEOUS
Status: DISCONTINUED | OUTPATIENT
Start: 2022-01-12 | End: 2022-01-13 | Stop reason: HOSPADM

## 2022-01-11 RX ORDER — HYDROMORPHONE HCL 110MG/55ML
0.25 PATIENT CONTROLLED ANALGESIA SYRINGE INTRAVENOUS EVERY 5 MIN PRN
Status: DISCONTINUED | OUTPATIENT
Start: 2022-01-11 | End: 2022-01-11 | Stop reason: HOSPADM

## 2022-01-11 RX ORDER — INSULIN LISPRO 100 [IU]/ML
0-3 INJECTION, SOLUTION INTRAVENOUS; SUBCUTANEOUS NIGHTLY
Status: DISCONTINUED | OUTPATIENT
Start: 2022-01-11 | End: 2022-01-13 | Stop reason: HOSPADM

## 2022-01-11 RX ADMIN — SODIUM CHLORIDE 1000 ML: 9 INJECTION, SOLUTION INTRAVENOUS at 17:52

## 2022-01-11 RX ADMIN — INSULIN GLARGINE 12 UNITS: 100 INJECTION, SOLUTION SUBCUTANEOUS at 20:24

## 2022-01-11 RX ADMIN — Medication 10 ML: at 21:18

## 2022-01-11 RX ADMIN — SODIUM CHLORIDE: 9 INJECTION, SOLUTION INTRAVENOUS at 12:54

## 2022-01-11 RX ADMIN — PANTOPRAZOLE SODIUM 40 MG: 40 TABLET, DELAYED RELEASE ORAL at 06:46

## 2022-01-11 RX ADMIN — PROPOFOL 50 MG: 10 INJECTION, EMULSION INTRAVENOUS at 13:05

## 2022-01-11 RX ADMIN — LIDOCAINE HYDROCHLORIDE 100 MG: 20 INJECTION, SOLUTION EPIDURAL; INFILTRATION; INTRACAUDAL; PERINEURAL at 13:02

## 2022-01-11 RX ADMIN — PROPOFOL 50 MG: 10 INJECTION, EMULSION INTRAVENOUS at 13:07

## 2022-01-11 RX ADMIN — CLOPIDOGREL BISULFATE 75 MG: 75 TABLET ORAL at 17:56

## 2022-01-11 RX ADMIN — LISINOPRIL 20 MG: 20 TABLET ORAL at 08:47

## 2022-01-11 RX ADMIN — Medication 10 ML: at 09:00

## 2022-01-11 RX ADMIN — INSULIN LISPRO 1 UNITS: 100 INJECTION, SOLUTION INTRAVENOUS; SUBCUTANEOUS at 18:35

## 2022-01-11 RX ADMIN — PANTOPRAZOLE SODIUM 40 MG: 40 TABLET, DELAYED RELEASE ORAL at 21:17

## 2022-01-11 RX ADMIN — PROPOFOL 50 MG: 10 INJECTION, EMULSION INTRAVENOUS at 13:02

## 2022-01-11 RX ADMIN — METOPROLOL SUCCINATE 25 MG: 25 TABLET, FILM COATED, EXTENDED RELEASE ORAL at 21:17

## 2022-01-11 RX ADMIN — INSULIN LISPRO 1 UNITS: 100 INJECTION, SOLUTION INTRAVENOUS; SUBCUTANEOUS at 21:34

## 2022-01-11 ASSESSMENT — PAIN DESCRIPTION - LOCATION: LOCATION: ABDOMEN

## 2022-01-11 ASSESSMENT — PULMONARY FUNCTION TESTS
PIF_VALUE: 13
PIF_VALUE: 2
PIF_VALUE: 1
PIF_VALUE: 4
PIF_VALUE: 16
PIF_VALUE: 1
PIF_VALUE: 0
PIF_VALUE: 24
PIF_VALUE: 14
PIF_VALUE: 30
PIF_VALUE: 13
PIF_VALUE: 1
PIF_VALUE: 26
PIF_VALUE: 24
PIF_VALUE: 1
PIF_VALUE: 1
PIF_VALUE: 15

## 2022-01-11 ASSESSMENT — PAIN SCALES - GENERAL
PAINLEVEL_OUTOF10: 0
PAINLEVEL_OUTOF10: 7
PAINLEVEL_OUTOF10: 5
PAINLEVEL_OUTOF10: 0

## 2022-01-11 ASSESSMENT — PAIN DESCRIPTION - PAIN TYPE: TYPE: ACUTE PAIN

## 2022-01-11 NOTE — PROGRESS NOTES
Office : [phone]     Fax :[phone]       Nephrology progress  Note      Patient's Name: Cheyenne Hancock  8:33 AM  1/11/2022    Reason for Consult:  Hyponatremia       Requesting Physician:  Ricki Mathew      Chief Complaint:    Chief Complaint   Patient presents with    Emesis     Pt reports emesis, and abdominal pain, HX diverticulitis          History of Present iIlness:    Cheyenne Hancock is a 70 y.o. female with female coronary artery disease on Plavix, diabetes, and hyperlipidemia. Patient presents the emergency room for 2 days of intractable nausea, vomiting, and diarrhea. Reports temp at home of 99.0. She is afebrile in the emergency room. Patient reports she has had at least 10 loose stools today and yesterday. Is been unable to keep any food down. She does report diffuse abdominal pain in her lower abdomen. States 10 out of 10 at its worse. She did receive pain medicine in the emergency room the pain has greatly improved. She denies any urinary frequency or dysuria. She has had prior diverticulitis and felt this pain was similar.     She was diagnosed with Covid early December. Her symptoms included cough and headache. Symptoms have improved. She was on azithromycin at that time. Initial lab work showed sodium of 127   HCO3 level  18     C/o chills   Has cough with no sputum     BP stable     Interval hx :    No fever   Nausea     Sodium level better     I/O last 3 completed shifts:   In: 80 [P.O.:120]  Out: 0 [Urine:1400]    Past Medical History:   Diagnosis Date    Diverticulitis     Hyperlipidemia     Hypertension        Past Surgical History:   Procedure Laterality Date    CORONARY ANGIOPLASTY WITH STENT PLACEMENT      OVARY REMOVAL  right    FOR OVARIAN CYSTS         Current Medications:    lidocaine PF 1 % injection 5 mL, Once  sodium chloride flush 0.9 % injection 5-40 mL, 2 times per day  sodium chloride flush 0.9 % injection 5-40 mL, PRN  0.9 % sodium chloride infusion, PRN  pantoprazole (PROTONIX) tablet 40 mg, QAM AC  [START ON 1/12/2022] enoxaparin (LOVENOX) injection 40 mg, Daily  dicyclomine (BENTYL) capsule 10 mg, 4x Daily PRN  metoprolol succinate (TOPROL XL) extended release tablet 25 mg, Nightly  clopidogrel (PLAVIX) tablet 75 mg, Daily  lisinopril (PRINIVIL;ZESTRIL) tablet 20 mg, Daily  sodium chloride flush 0.9 % injection 5-40 mL, 2 times per day  sodium chloride flush 0.9 % injection 10 mL, PRN  0.9 % sodium chloride infusion, PRN  ondansetron (ZOFRAN-ODT) disintegrating tablet 4 mg, Q8H PRN   Or  ondansetron (ZOFRAN) injection 4 mg, Q6H PRN  polyethylene glycol (GLYCOLAX) packet 17 g, Daily PRN  acetaminophen (TYLENOL) tablet 650 mg, Q6H PRN   Or  acetaminophen (TYLENOL) suppository 650 mg, Q6H PRN  potassium chloride 10 mEq/100 mL IVPB (Peripheral Line), PRN  magnesium sulfate 1000 mg in dextrose 5% 100 mL IVPB, PRN  sodium phosphate 12.69 mmol in dextrose 5 % 250 mL IVPB, PRN   Or  sodium phosphate 25.41 mmol in dextrose 5 % 250 mL IVPB, PRN  insulin lispro (1 Unit Dial) 0-6 Units, Q4H  glucose (GLUTOSE) 40 % oral gel 15 g, PRN  dextrose 50 % IV solution, PRN  glucagon (rDNA) injection 1 mg, PRN  dextrose 5 % solution, PRN  insulin glargine (LANTUS;BASAGLAR) injection pen 12 Units, Nightly            Physical exam:     Vitals:  /73   Pulse 53   Temp 97.2 °F (36.2 °C) (Axillary)   Resp 18   Wt 176 lb (79.8 kg)   SpO2 94%   BMI 34.37 kg/m²   Constitutional:  OAA X3 NAD  Skin: no rash, turgor wnl  Heent:  eomi, mmm  Neck: no bruits or jvd noted  Cardiovascular:  S1, S2 without m/r/g  Respiratory: CTA B without w/r/r  Abdomen:  +bs, soft, nt, nd  Ext: no lower extremity edema  Psychiatric: mood and affect appropriate  Musculoskeletal:  Rom, muscular strength intact    Labs:  CBC:   Recent Labs     01/09/22  0640 01/10/22  0422 01/11/22  0550   WBC 4.6 4.3 3.5*   HGB 12.8 12.4 12.3    157 155     BMP:    Recent Labs     01/09/22  0640 01/09/22  0640 01/09/22  1106 01/10/22  0422 01/11/22  0550   *   < > 136 137 141   K 4.1  --   --  3.8 3.8     --   --  103 105   CO2 21  --   --  23 26   BUN 13  --   --  8 6*   CREATININE 0.6  --   --  0.6 0.6   GLUCOSE 167*  --   --  101* 106*    < > = values in this interval not displayed. Ca/Mg/Phos:   Recent Labs     01/09/22  0640 01/10/22  0422 01/11/22  0550   CALCIUM 8.3 8.6 8.9     Hepatic:   Recent Labs     01/09/22  0640 01/10/22  0422 01/11/22  0550   AST 28 19 16   ALT 11 11 9*   BILITOT 0.3 0.3 0.3   ALKPHOS 61 59 61     Troponin:   Recent Labs     01/08/22  1613   TROPONINI <0.01     BNP: No results for input(s): BNP in the last 72 hours. Lipids: No results for input(s): CHOL, TRIG, HDL, LDLCALC, LABVLDL in the last 72 hours. ABGs: No results for input(s): PHART, PO2ART, POV2NFT in the last 72 hours. INR: No results for input(s): INR in the last 72 hours. UA:  Recent Labs     01/08/22  1720   COLORU YELLOW   CLARITYU CLOUDY*   GLUCOSEU >=1000*   BILIRUBINUR SMALL*   KETUA 40*   SPECGRAV >1.030   BLOODU Negative   PHUR 5.5   PROTEINU TRACE*   UROBILINOGEN 1.0   NITRU Negative   LEUKOCYTESUR Negative   LABMICR YES   URINETYPE NotGiven      Urine Microscopic:   Recent Labs     01/08/22  1720   BACTERIA 1+*   WBCUA 4   RBCUA 5*   EPIU 13*     Urine Culture: No results for input(s): LABURIN in the last 72 hours. Urine Chemistry:   Recent Labs     01/08/22  1720   NAUR 42                IMAGING:  XR ABDOMEN (KUB) (SINGLE AP VIEW)   Final Result   Several mildly distended loops of small bowel within the left upper quadrant,   corresponding to the findings on the previous CT scan, suggestive of an ileus.          CT ABDOMEN PELVIS W IV CONTRAST Additional Contrast? None   Final Result      1. Some mostly fluid filled and mildly distended small bowel loops are seen   within the mid to left abdomen without definite transition point. This is   nonspecific but may represent a mild ileus or changes of gastroenteritis. 2.  Hypodensity of the liver parenchyma suggests fatty infiltration though   other forms of intrinsic liver disease are not excluded. No focal liver   lesion or bile duct dilation is noted. 3.  A normal appearing appendix is identified. 4.  Diverticulosis without evidence of definite acute diverticulitis. CT CHEST PULMONARY EMBOLISM W CONTRAST   Final Result   No evidence of pulmonary embolism or acute pulmonary abnormality. Moderate coronary artery calcifications. Suggest cardiology follow-up. Assessment/Plan :      1. Hyponatremia 2/2 hypovolemia   Sodium level better   Improved with iv fluids   Give gentle iv fluids     2. HTN. Controlled on lisinopril     3. Metabolic acidosis . Improved     4.  Nausea/ vomiting \has chills   GI following   CT scan shows non specific findings of gastroenteritis and ileus   For EGD today         D/w primary team      Thank you for allowing us to participate in care of [facility]         Electronically signed by: Leif Savage MD, 1/11/2022, 8:33 AM      [facility] [address]  Office : [phone]  Fax :[phone]

## 2022-01-11 NOTE — PROGRESS NOTES
Pt transferred to room 3320 at this time. A&O with no signs of distress. Tele on, with visual on monitor, HR of 60's. Report given to AP HORVATH. V/u and denies questions or further needs at this time.

## 2022-01-11 NOTE — CARE COORDINATION
Discharge planning Note;    Patient  Reviewed for d/c planning needs ; Admitted from home , A&O, independent and it appears that patient has minimal needs for discharge at this time with readmission score of 8 %. Discussed with patients nurse and requested that case management be notified if discharge needs are identified. Patient has active insurance with [facility]  Medicare. Dr Kaylah Ferrera is listed as the PCP    Case management will continue to follow progress and update discharge plan as needed.

## 2022-01-11 NOTE — ANESTHESIA POSTPROCEDURE EVALUATION
Department of Anesthesiology  Postprocedure Note    Patient: Cali Alonzo  MRN: [id number]  YOB: 1950  Date of evaluation: 1/11/2022  Time:  1:50 PM     Procedure Summary     Date: 01/11/22 Room / Location: [address] [place] [address]    Anesthesia Start: 4016 Anesthesia Stop: 2966    Procedure: EGD DUODENAL AND GASTRIC  BIOPSY (N/A Abdomen) Diagnosis: (Nausea/Vomiting)    Surgeons: Boone Baird MD Responsible Provider: Tomas Prater MD    Anesthesia Type: MAC ASA Status: 3          Anesthesia Type: MAC    Tuan Phase I: Tuan Score: 10    Tuan Phase II:      Last vitals: Reviewed and per EMR flowsheets. Anesthesia Post Evaluation    Patient location during evaluation: PACU  Patient participation: complete - patient participated  Level of consciousness: awake  Airway patency: patent  Nausea & Vomiting: no vomiting  Complications: no  Cardiovascular status: hemodynamically stable  Respiratory status: acceptable  Hydration status: euvolemic  There was medical reason for not using a multimodal analgesia pain management approach.

## 2022-01-11 NOTE — PROGRESS NOTES
Pt stable and able to be transferred from PACU to room 3320. A&O , VSS, with no complaints at this time. 3A called and notified that pt is being transferred out of PACU and to room.

## 2022-01-11 NOTE — H&P
Gastroenterology Note             Pre-operative History and Physical    Patient: Jeanne Locke  : 1950  CSN:     History Obtained From:  patient and/or guardian. HISTORY OF PRESENT ILLNESS:    The patient is a 70 y.o. female  here for EGD comes in for an EGD she has been admitted to the hospital she had several days of severe nausea vomiting diarrhea CT scan revealed fluid-filled small bowel loops  As of her persistent nausea vomiting really made her rounds yesterday and the fact that she says she is not eating well we felt it was good to do an upper endoscopic evaluation    Past Medical History:    Past Medical History:   Diagnosis Date    Diverticulitis     Hyperlipidemia     Hypertension      Past Surgical History:    Past Surgical History:   Procedure Laterality Date    CORONARY ANGIOPLASTY WITH STENT PLACEMENT      OVARY REMOVAL  right    FOR OVARIAN CYSTS     Medications Prior to Admission:   No current facility-administered medications on file prior to encounter. Current Outpatient Medications on File Prior to Encounter   Medication Sig Dispense Refill    metFORMIN (GLUCOPHAGE) 500 MG tablet Take 500 mg by mouth 2 times daily (with meals)      METOPROLOL SUCCINATE PO Take 25 mg by mouth daily       clopidogrel (PLAVIX) 75 MG tablet Take 75 mg by mouth daily      lisinopril-hydrochlorothiazide (PRINZIDE;ZESTORETIC) 20-12.5 MG per tablet Take 1 tablet by mouth daily. Allergies:  Patient has no known allergies.       Social History:   Social History     Tobacco Use    Smoking status: Never Smoker    Smokeless tobacco: Never Used   Substance Use Topics    Alcohol use: No     Family History:   Family History   Problem Relation Age of Onset    Heart Disease Mother        PHYSICAL EXAM:      BP (!) 161/72   Pulse 62   Temp 98 °F (36.7 °C) (Temporal)   Resp 18   Ht 5' (1.524 m)   Wt 176 lb (79.8 kg)   SpO2 99%   BMI 34.37 kg/m²  I        Heart:   RRR, normal s1s2    Lungs:  CTA bilat,  Normal effort    Abdomen:   NT, ND      ASA Grade:  ASA 3 - Patient with moderate systemic disease with functional limitations    Mallampati Class: 2          ASSESSMENT AND PLAN:    1. Patient is a 70 y.o. female here for EGD with MAC.   2.  Procedure options, risks and benefits reviewed with patient. Patient expresses understanding.     Jude Brian MD,   [address]  1/11/2022

## 2022-01-11 NOTE — PROGRESS NOTES
[facility]    1/11/2022 8:06 AM        Name: Jude Clark . Admitted: 1/8/2022  Primary Care Provider: Monica Larry (Tel: None)      Chief Complaint   Patient presents with    Emesis     Pt reports emesis, and abdominal pain, HX diverticulitis      Hospital Course: Patient is a 69 yo female with hx diverticulitis, DM2, HTN, HLD, CAD/PCI. She was diagnosed with COVID early December, her symptoms included cough and HA and had improved. Patient presented to hospital with lower abdominal pain, intractable n/v/d which started 2 days prior to admission. CT abd suggestive of mild ileus    Subjective:  Presently resting in bed. NPO for EGD this am. States she feels better compared to admission, feels stronger. Still with lower abdominal discomfort mostly noted when trying to have BM or passing gas, says the \"gas is horrible. \" Denies nausea or vomiting. States she feels constipated but continues to have looses stools, had 1 episode overnight. Denies chest painb, shortness of breath.      Reviewed interval ancillary notes    Current Medications  lidocaine PF 1 % injection 5 mL, Once  sodium chloride flush 0.9 % injection 5-40 mL, 2 times per day  sodium chloride flush 0.9 % injection 5-40 mL, PRN  0.9 % sodium chloride infusion, PRN  pantoprazole (PROTONIX) tablet 40 mg, QAM AC  [START ON 1/12/2022] enoxaparin (LOVENOX) injection 40 mg, Daily  dicyclomine (BENTYL) capsule 10 mg, 4x Daily PRN  metoprolol succinate (TOPROL XL) extended release tablet 25 mg, Nightly  clopidogrel (PLAVIX) tablet 75 mg, Daily  lisinopril (PRINIVIL;ZESTRIL) tablet 20 mg, Daily  sodium chloride flush 0.9 % injection 5-40 mL, 2 times per day  sodium chloride flush 0.9 % injection 10 mL, PRN  0.9 % sodium chloride infusion, PRN  ondansetron (ZOFRAN-ODT) disintegrating tablet 4 mg, Q8H PRN   Or  ondansetron (ZOFRAN) injection 4 mg, Q6H PRN  polyethylene glycol (GLYCOLAX) packet 17 g, Daily PRN  acetaminophen (TYLENOL) tablet 650 mg, Q6H PRN   Or  acetaminophen (TYLENOL) suppository 650 mg, Q6H PRN  potassium chloride 10 mEq/100 mL IVPB (Peripheral Line), PRN  magnesium sulfate 1000 mg in dextrose 5% 100 mL IVPB, PRN  sodium phosphate 12.69 mmol in dextrose 5 % 250 mL IVPB, PRN   Or  sodium phosphate 25.41 mmol in dextrose 5 % 250 mL IVPB, PRN  insulin lispro (1 Unit Dial) 0-6 Units, Q4H  glucose (GLUTOSE) 40 % oral gel 15 g, PRN  dextrose 50 % IV solution, PRN  glucagon (rDNA) injection 1 mg, PRN  dextrose 5 % solution, PRN  insulin glargine (LANTUS;BASAGLAR) injection pen 12 Units, Nightly        Objective:  /73   Pulse 53   Temp 97.2 °F (36.2 °C) (Axillary)   Resp 18   Wt 176 lb (79.8 kg)   SpO2 94%   BMI 34.37 kg/m²     Intake/Output Summary (Last 24 hours) at 1/11/2022 0806  Last data filed at 1/11/2022 0430  Gross per 24 hour   Intake --   Output 950 ml   Net -950 ml      Wt Readings from Last 3 Encounters:   01/10/22 176 lb (79.8 kg)   11/22/19 175 lb (79.4 kg)   06/24/19 173 lb (78.5 kg)       General appearance:  Appears comfortable  Eyes: Sclera clear. Pupils equal.  ENT: Moist oral mucosa. Trachea midline, no adenopathy. Cardiovascular: Regular rhythm, normal S1, S2. No murmur. No edema in lower extremities  Respiratory: Not using accessory muscles. Good inspiratory effort. Clear to auscultation bilaterally, no wheeze or crackles. GI: Abdomen soft, no tenderness, not distended, normal bowel sounds  Musculoskeletal: No cyanosis in digits, neck supple  Neurology: CN 2-12 grossly intact. No speech or motor deficits  Psych: Normal affect.  Alert and oriented in time, place and person  Skin: Warm, dry, normal turgor    Labs and Tests:  CBC:   Recent Labs     01/09/22  0640 01/10/22  0422 01/11/22  0550   WBC 4.6 4.3 3.5*   HGB 12.8 12.4 12.3    157 155     BMP:    Recent Labs     01/09/22  0640 01/09/22  0640 01/09/22  1106 01/10/22  0422 01/11/22  0550   *   < > 136 137 141   K 4.1  --   --  3.8 3.8     --   --  103 105   CO2 21  --   --  23 26   BUN 13  --   --  8 6*   CREATININE 0.6  --   --  0.6 0.6   GLUCOSE 167*  --   --  101* 106*    < > = values in this interval not displayed. Hepatic:   Recent Labs     01/09/22  0640 01/10/22  0422 01/11/22  0550   AST 28 19 16   ALT 11 11 9*   BILITOT 0.3 0.3 0.3   ALKPHOS 61 59 61     Results for [facility] (MRN [id number]) as of 1/11/2022 08:19   Ref. Range 1/10/2022 11:40 1/10/2022 16:31 1/10/2022 20:29 1/11/2022 06:06 1/11/2022 07:35   POC Glucose Latest Ref Range: 70 - 99 mg/dl 106 (H) 101 (H) 135 (H) 99 108 (H)         CT Pulmonary 1/8/2022:  No evidence of pulmonary embolism or acute pulmonary abnormality.       Moderate coronary artery calcifications.  Suggest cardiology follow-up. CT Abdomen / Pelvis 1/8/2022:  1.  Some mostly fluid filled and mildly distended small bowel loops are seen   within the mid to left abdomen without definite transition point.  This is   nonspecific but may represent a mild ileus or changes of gastroenteritis.       2.  Hypodensity of the liver parenchyma suggests fatty infiltration though   other forms of intrinsic liver disease are not excluded. No focal liver   lesion or bile duct dilation is noted.       3.  A normal appearing appendix is identified.       4.  Diverticulosis without evidence of definite acute diverticulitis. KUB 1/9/2022:  Several mildly distended loops of small bowel within the left upper quadrant,   corresponding to the findings on the previous CT scan, suggestive of an ileus. Problem List  Active Problems:    Nausea vomiting and diarrhea    Hyponatremia    Gastroenteritis  Resolved Problems:    * No resolved hospital problems. *       Assessment & Plan:   1. Abdominal pain / persistent nausea / loose stools.  CT abdomen and pelvis (1/8) showed diverticulosis without any evidence of definitive acute diverticulitis, without any evidence of obstruction; findings suggestive of mild ileus or gastroenteritis. Abd xray (1/9) suggestive of ileus but patient is having bowel movements. GI panel negative, c. Diff also negative. NPO for EGD today. GI on board. 2. HTN. BP controlled. Continue lisinopril, metoprolol. HCTZ held on admission. 3. Hyponatremia. Sodium 127 on presentation. Likely secondary to hypovolemia from inadequate po intake, GI losses and HCTZ use. Resolved with IV fluids, 141 today. Nephrology on board. 4. DM2. A1c 8.2. Takes metformin at home, held on admission. Being covered with Lantus and low dose correction. BG values controlled. 5. CAD/PCI. Denies chest pain. Troponin < 0.01. Continue Plavix. Disposition: Anticipate home no needs when tolerating po diet.     Diet: Diet NPO  Code:Full Code  DVT PPX: enoxaparin      DERRICK Wallace - CNP   1/11/2022 8:06 AM

## 2022-01-11 NOTE — ANESTHESIA PRE PROCEDURE
times per day Birgit Reid MD   10 mL at 01/10/22 2044    sodium chloride flush 0.9 % injection 5-40 mL  5-40 mL IntraVENous PRN Birgit Reid MD        0.9 % sodium chloride infusion  25 mL IntraVENous PRN Birgit Reid MD        pantoprazole (PROTONIX) tablet 40 mg  40 mg Oral QAM AC Pearlck MARITA GilliamC   40 mg at 01/11/22 0646    [START ON 1/12/2022] enoxaparin (LOVENOX) injection 40 mg  40 mg SubCUTAneous Daily Cayla Tabor PA-C        dicyclomine (BENTYL) capsule 10 mg  10 mg Oral 4x Daily PRN Carmina Keller PA-C   10 mg at 01/10/22 2108    metoprolol succinate (TOPROL XL) extended release tablet 25 mg  25 mg Oral Nightly DERRICK Medellin - CNP   25 mg at 01/10/22 2042    clopidogrel (PLAVIX) tablet 75 mg  75 mg Oral Daily Toan Lyn MD   75 mg at 01/10/22 0756    lisinopril (PRINIVIL;ZESTRIL) tablet 20 mg  20 mg Oral Daily Toan yLn MD   20 mg at 01/11/22 0847    sodium chloride flush 0.9 % injection 5-40 mL  5-40 mL IntraVENous 2 times per day Toan Lyn MD   10 mL at 01/09/22 2151    sodium chloride flush 0.9 % injection 10 mL  10 mL IntraVENous PRN Kit Mahmood MD        0.9 % sodium chloride infusion  25 mL IntraVENous PRN Toan Lyn MD        ondansetron (ZOFRAN-ODT) disintegrating tablet 4 mg  4 mg Oral Q8H PRN Toan Lyn MD        Or    ondansetron (ZOFRAN) injection 4 mg  4 mg IntraVENous Q6H PRN Toan Lyn MD   4 mg at 01/09/22 1429    polyethylene glycol (GLYCOLAX) packet 17 g  17 g Oral Daily PRN Toan Lyn MD        acetaminophen (TYLENOL) tablet 650 mg  650 mg Oral Q6H PRN Toan Lyn MD   650 mg at 01/10/22 2235    Or    acetaminophen (TYLENOL) suppository 650 mg  650 mg Rectal Q6H PRN Toan Lyn MD        potassium chloride 10 mEq/100 mL IVPB (Peripheral Line)  10 mEq IntraVENous PRN Toan Lyn MD        magnesium sulfate 1000 mg in dextrose 5% 100 mL IVPB  1,000 mg IntraVENous DANIEL Lyn MD        sodium phosphate 12.69 mmol in dextrose 5 % 250 mL IVPB  0.16 mmol/kg IntraVENous PRN Toan Lyn MD        Or    sodium phosphate 25.41 mmol in dextrose 5 % 250 mL IVPB  0.32 mmol/kg IntraVENous PRN Toan Lyn MD        insulin lispro (1 Unit Dial) 0-6 Units  0-6 Units SubCUTAneous Q4H Toan Lyn MD   1 Units at 01/09/22 0636    glucose (GLUTOSE) 40 % oral gel 15 g  15 g Oral PRN Toan Lyn MD        dextrose 50 % IV solution  12.5 g IntraVENous PRN Toan Lyn MD        glucagon (rDNA) injection 1 mg  1 mg IntraMUSCular PRRENAE Lyn MD        dextrose 5 % solution  100 mL/hr IntraVENous PRRENAE Lyn MD        insulin glargine (LANTUS;BASAGLAR) injection pen 12 Units  0.15 Units/kg SubCUTAneous Nightly Toan Lyn MD   12 Units at 01/10/22 2043       Allergies:  No Known Allergies    Problem List:    Patient Active Problem List   Diagnosis Code    Diverticulitis of sigmoid colon K57.32    Nausea vomiting and diarrhea R11.2, R19.7    Hyponatremia E87.1    Gastroenteritis K52.9       Past Medical History:        Diagnosis Date    Diverticulitis     Hyperlipidemia     Hypertension        Past Surgical History:        Procedure Laterality Date    CORONARY ANGIOPLASTY WITH STENT PLACEMENT      OVARY REMOVAL  right    FOR OVARIAN CYSTS       Social History:    Social History     Tobacco Use    Smoking status: Never Smoker    Smokeless tobacco: Never Used   Substance Use Topics    Alcohol use:  No                                Counseling given: Not Answered      Vital Signs (Current):   Vitals:    01/10/22 2030 01/11/22 0052 01/11/22 0427 01/11/22 0838   BP: (!) 144/64 (!) 93/53 111/73 (!) 155/72   Pulse: 70 53 53 63   Resp: 18 19 18 18   Temp: 97.9 °F (36.6 °C) 97.4 °F (36.3 °C) 97.2 °F (36.2 °C) 97.5 °F (36.4 °C)   TempSrc: Oral Axillary Axillary Oral   SpO2: 97% 93% 94% 96%   Weight: BP Readings from Last 3 Encounters:   01/11/22 (!) 155/72   11/22/19 (!) 171/63   08/01/16 174/73       NPO Status:                                                                                 BMI:   Wt Readings from Last 3 Encounters:   01/10/22 176 lb (79.8 kg)   11/22/19 175 lb (79.4 kg)   06/24/19 173 lb (78.5 kg)     Body mass index is 34.37 kg/m².     CBC:   Lab Results   Component Value Date    WBC 3.5 01/11/2022    RBC 4.18 01/11/2022    HGB 12.3 01/11/2022    HCT 35.8 01/11/2022    MCV 85.7 01/11/2022    RDW 13.8 01/11/2022     01/11/2022       CMP:   Lab Results   Component Value Date     01/11/2022    K 3.8 01/11/2022     01/11/2022    CO2 26 01/11/2022    BUN 6 01/11/2022    CREATININE 0.6 01/11/2022    GFRAA >60 01/11/2022    GFRAA >60 01/06/2013    AGRATIO 1.2 01/11/2022    LABGLOM >60 01/11/2022    GLUCOSE 106 01/11/2022    PROT 6.2 01/11/2022    PROT 7.7 01/06/2013    CALCIUM 8.9 01/11/2022    BILITOT 0.3 01/11/2022    ALKPHOS 61 01/11/2022    AST 16 01/11/2022    ALT 9 01/11/2022       POC Tests:   Recent Labs     01/11/22  0735   POCGLU 108*       Coags:   Lab Results   Component Value Date    PROTIME 12.2 01/06/2013    INR 1.07 01/06/2013    APTT 29.7 01/06/2013       HCG (If Applicable): No results found for: PREGTESTUR, PREGSERUM, HCG, HCGQUANT     ABGs: No results found for: PHART, PO2ART, YGM9RQP, LMP6HOT, BEART, M8MIKNSC     Type & Screen (If Applicable):  No results found for: LABABO, LABRH    Drug/Infectious Status (If Applicable):  No results found for: HIV, HEPCAB    COVID-19 Screening (If Applicable):   Lab Results   Component Value Date    COVID19 Not Detected 01/10/2022           Anesthesia Evaluation  Patient summary reviewed no history of anesthetic complications:   Airway: Mallampati: II  TM distance: >3 FB   Neck ROM: full  Mouth opening: > = 3 FB Dental:    (+) partials and lower dentures      Pulmonary: breath sounds clear to auscultation                             Cardiovascular:    (+) hypertension:, CABG/stent (cardiac stent 5 yrs ago. no CP since):,     (-) dysrhythmias and  angina      Rhythm: regular  Rate: normal                 ROS comment: [facility]   Echocardiography   [address] [place], [address]   Phone: [phone]                             Adult Echocardiogram Report   Name: Amalia Baez   : 1950                   Age: 79 yrs   EPI: [id number]              Gender: Female   Height: 60 in                     Weight: 180 lb   Blood Pressure: 110/83 mmHg   Accession Number: [id number]   Order Number: [id number]   Account Number: [[id number]]   Patient Location: [id number]   Study Date: 2020 09:18 AM     Summary:   Normal sinus rhythm, with a rate of 71 beats per minute. Overall left ventricular ejection fraction is estimated to be 65-70%. The left ventricular wall motion is normal.   There is mild mitral annular calcification. The mitral valve leaflets are mildly calcified in appearance. The diastolic function is impaired and classified as Grade 1 (impaired   relaxation). The Aortic Valve leaflets appear sclerotic. Neuro/Psych:      (-) seizures, TIA and CVA           GI/Hepatic/Renal:   (+) GERD (no sx today):,          ROS comment: Recent n/v/d. Denies n/v today. Endo/Other:    (+) blood dyscrasia: anticoagulation therapy:., .                 Abdominal:   (+) obese,           Vascular: negative vascular ROS. Other Findings:           Anesthesia Plan      MAC     ASA 3       Induction: intravenous. Anesthetic plan and risks discussed with patient. Use of blood products discussed with patient whom did not consent to blood products. Special considerations: [facility]. Blood Products Consent Comment: Patient refuses blood or blood products even if it means she could die without them.   Plan discussed with Adalid Handy MD   1/11/2022

## 2022-01-11 NOTE — PROGRESS NOTES
Pt arrived from ENDO to PACU bay 8. Report received from ENDO staff. Pt arouses to voice. Pt on 6L simple mask, NSR, and VSS. Will continue to monitor.

## 2022-01-12 LAB
CRYPTOSPORIDIUM ANTIGEN STOOL: NORMAL
E HISTOLYTICA ANTIGEN STOOL: NORMAL
GI BACTERIAL PATHOGENS BY PCR: NORMAL
GIARDIA ANTIGEN STOOL: NORMAL
GLUCOSE BLD-MCNC: 104 MG/DL (ref 70–99)
GLUCOSE BLD-MCNC: 118 MG/DL (ref 70–99)
GLUCOSE BLD-MCNC: 118 MG/DL (ref 70–99)
GLUCOSE BLD-MCNC: 127 MG/DL (ref 70–99)
GLUCOSE BLD-MCNC: 98 MG/DL (ref 70–99)
PERFORMED ON: ABNORMAL
PERFORMED ON: NORMAL

## 2022-01-12 PROCEDURE — 6370000000 HC RX 637 (ALT 250 FOR IP): Performed by: PHYSICIAN ASSISTANT

## 2022-01-12 PROCEDURE — 1200000000 HC SEMI PRIVATE

## 2022-01-12 PROCEDURE — 99232 SBSQ HOSP IP/OBS MODERATE 35: CPT | Performed by: INTERNAL MEDICINE

## 2022-01-12 PROCEDURE — 2580000003 HC RX 258: Performed by: INTERNAL MEDICINE

## 2022-01-12 PROCEDURE — 6370000000 HC RX 637 (ALT 250 FOR IP): Performed by: NURSE PRACTITIONER

## 2022-01-12 PROCEDURE — 6370000000 HC RX 637 (ALT 250 FOR IP): Performed by: INTERNAL MEDICINE

## 2022-01-12 PROCEDURE — 6360000002 HC RX W HCPCS: Performed by: INTERNAL MEDICINE

## 2022-01-12 RX ORDER — PEG-3350, SODIUM SULFATE, SODIUM CHLORIDE, POTASSIUM CHLORIDE, SODIUM ASCORBATE AND ASCORBIC ACID 7.5-2.691G
100 KIT ORAL ONCE
Status: COMPLETED | OUTPATIENT
Start: 2022-01-12 | End: 2022-01-12

## 2022-01-12 RX ADMIN — ACETAMINOPHEN 650 MG: 325 TABLET ORAL at 02:30

## 2022-01-12 RX ADMIN — POLYETHYLENE GLYCOL 3350, SODIUM SULFATE, SODIUM CHLORIDE, POTASSIUM CHLORIDE, ASCORBIC ACID, SODIUM ASCORBATE 100 G: KIT at 21:34

## 2022-01-12 RX ADMIN — Medication 10 ML: at 21:28

## 2022-01-12 RX ADMIN — LISINOPRIL 20 MG: 20 TABLET ORAL at 08:32

## 2022-01-12 RX ADMIN — ENOXAPARIN SODIUM 40 MG: 40 INJECTION SUBCUTANEOUS at 08:32

## 2022-01-12 RX ADMIN — INSULIN GLARGINE 12 UNITS: 100 INJECTION, SOLUTION SUBCUTANEOUS at 21:36

## 2022-01-12 RX ADMIN — Medication 10 ML: at 08:35

## 2022-01-12 RX ADMIN — POLYETHYLENE GLYCOL 3350, SODIUM SULFATE, SODIUM CHLORIDE, POTASSIUM CHLORIDE, ASCORBIC ACID, SODIUM ASCORBATE 100 G: KIT at 15:51

## 2022-01-12 RX ADMIN — Medication 10 ML: at 07:47

## 2022-01-12 RX ADMIN — PANTOPRAZOLE SODIUM 40 MG: 40 TABLET, DELAYED RELEASE ORAL at 21:29

## 2022-01-12 RX ADMIN — CLOPIDOGREL BISULFATE 75 MG: 75 TABLET ORAL at 08:32

## 2022-01-12 RX ADMIN — METOPROLOL SUCCINATE 25 MG: 25 TABLET, FILM COATED, EXTENDED RELEASE ORAL at 21:29

## 2022-01-12 RX ADMIN — PANTOPRAZOLE SODIUM 40 MG: 40 TABLET, DELAYED RELEASE ORAL at 08:32

## 2022-01-12 ASSESSMENT — PAIN DESCRIPTION - PROGRESSION: CLINICAL_PROGRESSION: GRADUALLY WORSENING

## 2022-01-12 ASSESSMENT — PAIN DESCRIPTION - PAIN TYPE: TYPE: ACUTE PAIN

## 2022-01-12 ASSESSMENT — PAIN DESCRIPTION - DESCRIPTORS: DESCRIPTORS: ACHING

## 2022-01-12 ASSESSMENT — PAIN DESCRIPTION - LOCATION: LOCATION: ABDOMEN

## 2022-01-12 ASSESSMENT — PAIN DESCRIPTION - ORIENTATION: ORIENTATION: LOWER

## 2022-01-12 ASSESSMENT — PAIN DESCRIPTION - ONSET: ONSET: ON-GOING

## 2022-01-12 ASSESSMENT — PAIN SCALES - GENERAL
PAINLEVEL_OUTOF10: 8
PAINLEVEL_OUTOF10: 0
PAINLEVEL_OUTOF10: 4
PAINLEVEL_OUTOF10: 0

## 2022-01-12 ASSESSMENT — PAIN DESCRIPTION - FREQUENCY: FREQUENCY: CONTINUOUS

## 2022-01-12 NOTE — PLAN OF CARE
Problem: Falls - Risk of:  Goal: Will remain free from falls  Description: Will remain free from falls  Outcome: Ongoing     Problem: Falls - Risk of:  Goal: Absence of physical injury  Description: Absence of physical injury  Outcome: Ongoing     Problem: Pain:  Goal: Pain level will decrease  Description: Pain level will decrease  Outcome: Ongoing  Goal: Control of acute pain  Description: Control of acute pain  Outcome: Ongoing  Goal: Control of chronic pain  Description: Control of chronic pain  Outcome: Ongoing

## 2022-01-12 NOTE — PROGRESS NOTES
Office : [phone]     Fax :[phone]       Nephrology progress  Note      Patient's Name: Ghazal Puckett  10:44 AM  1/12/2022    Reason for Consult:  Hyponatremia       Requesting Physician:  Cleven Oppenheim      Chief Complaint:    Chief Complaint   Patient presents with    Emesis     Pt reports emesis, and abdominal pain, HX diverticulitis          History of Present iIlness:    Ghazal Puckett is a 70 y.o. female with female coronary artery disease on Plavix, diabetes, and hyperlipidemia. Patient presents the emergency room for 2 days of intractable nausea, vomiting, and diarrhea. Reports temp at home of 99.0. She is afebrile in the emergency room. Patient reports she has had at least 10 loose stools today and yesterday. Is been unable to keep any food down. She does report diffuse abdominal pain in her lower abdomen. States 10 out of 10 at its worse. She did receive pain medicine in the emergency room the pain has greatly improved. She denies any urinary frequency or dysuria. She has had prior diverticulitis and felt this pain was similar.     She was diagnosed with Covid early December. Her symptoms included cough and headache. Symptoms have improved. She was on azithromycin at that time. Initial lab work showed sodium of 127   HCO3 level  18     C/o chills   Has cough with no sputum     BP stable     Interval hx :    No fever   Nausea     Sodium level better     I/O last 3 completed shifts: In: 1407.2 [P.O.:610;  I.V.:797.2]  Out: 200 [Urine:1850]    Past Medical History:   Diagnosis Date    Diverticulitis     Hyperlipidemia     Hypertension        Past Surgical History:   Procedure Laterality Date    CORONARY ANGIOPLASTY WITH STENT PLACEMENT      OVARY REMOVAL right    FOR OVARIAN CYSTS    UPPER GASTROINTESTINAL ENDOSCOPY N/A 1/11/2022    EGD DUODENAL AND GASTRIC  [facility] [address]         Current Medications:    0.9 % sodium chloride infusion, Continuous  pantoprazole (PROTONIX) tablet 40 mg, BID  insulin lispro (1 Unit Dial) 0-6 Units, TID WC  insulin lispro (1 Unit Dial) 0-3 Units, Nightly  lidocaine PF 1 % injection 5 mL, Once  sodium chloride flush 0.9 % injection 5-40 mL, 2 times per day  sodium chloride flush 0.9 % injection 5-40 mL, PRN  0.9 % sodium chloride infusion, PRN  enoxaparin (LOVENOX) injection 40 mg, Daily  dicyclomine (BENTYL) capsule 10 mg, 4x Daily PRN  metoprolol succinate (TOPROL XL) extended release tablet 25 mg, Nightly  clopidogrel (PLAVIX) tablet 75 mg, Daily  lisinopril (PRINIVIL;ZESTRIL) tablet 20 mg, Daily  sodium chloride flush 0.9 % injection 5-40 mL, 2 times per day  sodium chloride flush 0.9 % injection 10 mL, PRN  0.9 % sodium chloride infusion, PRN  ondansetron (ZOFRAN-ODT) disintegrating tablet 4 mg, Q8H PRN   Or  ondansetron (ZOFRAN) injection 4 mg, Q6H PRN  polyethylene glycol (GLYCOLAX) packet 17 g, Daily PRN  acetaminophen (TYLENOL) tablet 650 mg, Q6H PRN   Or  acetaminophen (TYLENOL) suppository 650 mg, Q6H PRN  potassium chloride 10 mEq/100 mL IVPB (Peripheral Line), PRN  magnesium sulfate 1000 mg in dextrose 5% 100 mL IVPB, PRN  sodium phosphate 12.69 mmol in dextrose 5 % 250 mL IVPB, PRN   Or  sodium phosphate 25.41 mmol in dextrose 5 % 250 mL IVPB, PRN  glucose (GLUTOSE) 40 % oral gel 15 g, PRN  dextrose 50 % IV solution, PRN  glucagon (rDNA) injection 1 mg, PRN  dextrose 5 % solution, PRN  insulin glargine (LANTUS;BASAGLAR) injection pen 12 Units, Nightly            Physical exam:     Vitals:  BP (!) 171/77   Pulse 58   Temp 97.2 °F (36.2 °C) (Oral)   Resp 16   Ht 5' (1.524 m)   Wt 173 lb 11.2 oz (78.8 kg)   SpO2 95%   BMI 33.92 kg/m²   Constitutional:  OAA X3 NAD  Skin: no rash, turgor wnl  Heent:  eomi, mmm  Neck: no bruits or jvd noted  Cardiovascular:  S1, S2 without m/r/g  Respiratory: CTA B without w/r/r  Abdomen:  +bs, soft, nt, nd  Ext: no lower extremity edema  Psychiatric: mood and affect appropriate  Musculoskeletal:  Rom, muscular strength intact    Labs:  CBC:   Recent Labs     01/10/22  0422 01/11/22  0550   WBC 4.3 3.5*   HGB 12.4 12.3    155     BMP:    Recent Labs     01/09/22  1106 01/10/22  0422 01/11/22  0550    137 141   K  --  3.8 3.8   CL  --  103 105   CO2  --  23 26   BUN  --  8 6*   CREATININE  --  0.6 0.6   GLUCOSE  --  101* 106*     Ca/Mg/Phos:   Recent Labs     01/10/22  0422 01/11/22  0550   CALCIUM 8.6 8.9     Hepatic:   Recent Labs     01/10/22  0422 01/11/22  0550   AST 19 16   ALT 11 9*   BILITOT 0.3 0.3   ALKPHOS 59 61     Troponin:   No results for input(s): TROPONINI in the last 72 hours. BNP: No results for input(s): BNP in the last 72 hours. Lipids: No results for input(s): CHOL, TRIG, HDL, LDLCALC, LABVLDL in the last 72 hours. ABGs: No results for input(s): PHART, PO2ART, REN3YXJ in the last 72 hours. INR: No results for input(s): INR in the last 72 hours. UA:  No results for input(s): Nyla Agus, GLUCOSEU, BILIRUBINUR, KETUA, SPECGRAV, BLOODU, PHUR, PROTEINU, UROBILINOGEN, NITRU, LEUKOCYTESUR, Shun Couch in the last 72 hours. Urine Microscopic:   No results for input(s): LABCAST, BACTERIA, COMU, HYALCAST, WBCUA, RBCUA, EPIU in the last 72 hours. Urine Culture: No results for input(s): LABURIN in the last 72 hours. Urine Chemistry:   No results for input(s): Hannah Mariner, PROTEINUR, NAUR in the last 72 hours. IMAGING:  XR ABDOMEN (KUB) (SINGLE AP VIEW)   Final Result   Several mildly distended loops of small bowel within the left upper quadrant,   corresponding to the findings on the previous CT scan, suggestive of an ileus.          CT ABDOMEN PELVIS W IV CONTRAST Additional Contrast? None   Final Result      1. Some mostly fluid filled and mildly distended small bowel loops are seen   within the mid to left abdomen without definite transition point. This is   nonspecific but may represent a mild ileus or changes of gastroenteritis. 2.  Hypodensity of the liver parenchyma suggests fatty infiltration though   other forms of intrinsic liver disease are not excluded. No focal liver   lesion or bile duct dilation is noted. 3.  A normal appearing appendix is identified. 4.  Diverticulosis without evidence of definite acute diverticulitis. CT CHEST PULMONARY EMBOLISM W CONTRAST   Final Result   No evidence of pulmonary embolism or acute pulmonary abnormality. Moderate coronary artery calcifications. Suggest cardiology follow-up. Assessment/Plan :      1. Hyponatremia 2/2 hypovolemia   Sodium level better   Improved with iv fluids   Give gentle iv fluids     2. HTN. Controlled on lisinopril   Continue BP meds    3. Metabolic acidosis . Improved     4. Nausea/ vomiting \has chills   GI following   CT scan shows non specific findings of gastroenteritis and ileus   S/p EGD. Duong Craven Has gastritis   On PPI     Will sign off.  Call as needed        D/w primary team      Thank you for allowing us to participate in care of Madeleine Lynn         Electronically signed by: Mozell Sicard, MD, 1/12/2022, 10:44 AM      [facility] of [address]  Office : [phone]  Fax :[phone]

## 2022-01-12 NOTE — PROGRESS NOTES
Gastroenterology Progress Note    Madeleine Lynn is a 70 y.o. female patient. 1. Nausea vomiting and diarrhea    2. Abnormal abdominal CT scan    3. Shortness of breath    4. Dehydration    5. Hyponatremia        SUBJECTIVE: No further nausea or vomiting. Still has the fullness and tenderness in lower abd. ROS:  Cardiovascular ROS: no chest pain or dyspnea on exertion  Gastrointestinal ROS: see hpi  Respiratory ROS: no cough, shortness of breath, or wheezing    Physical    VITALS:  BP (!) 171/77   Pulse 58   Temp 97.2 °F (36.2 °C) (Oral)   Resp 16   Ht 5' (1.524 m)   Wt 173 lb 11.2 oz (78.8 kg)   SpO2 95%   BMI 33.92 kg/m²   TEMPERATURE:  Current - Temp: 97.2 °F (36.2 °C); Max - Temp  Av.5 °F (36.4 °C)  Min: 97.2 °F (36.2 °C)  Max: 97.8 °F (36.6 °C)    NAD  RRR, Nl s1s2  Lungs CTA Bilaterally, normal effort  Abdomen soft, ND, NT, no HSM, Bowel sounds normal  AAOx3, No asterixis     Data    Data Review:    Recent Labs     01/10/22  0422 22  0550   WBC 4.3 3.5*   HGB 12.4 12.3   HCT 36.4 35.8*   MCV 86.3 85.7    155     Recent Labs     01/10/22  0422 22  0550    141   K 3.8 3.8    105   CO2 23 26   BUN 8 6*   CREATININE 0.6 0.6     Recent Labs     01/10/22  0422 22  0550   AST 19 16   ALT 11 9*   BILITOT 0.3 0.3   ALKPHOS 59 61     No results for input(s): LIPASE, AMYLASE in the last 72 hours. No results for input(s): PROTIME, INR in the last 72 hours. No results for input(s): PTT in the last 72 hours. Radiology Review:      CT Abdomen Pelvis with IV contrast      1.  Some mostly fluid filled and mildly distended small bowel loops are seen   within the mid to left abdomen without definite transition point.  This is   nonspecific but may represent a mild ileus or changes of gastroenteritis.       2.  Hypodensity of the liver parenchyma suggests fatty infiltration though   other forms of intrinsic liver disease are not excluded.  No focal liver lesion or bile duct dilation is noted.       3.  A normal appearing appendix is identified.       4.  Diverticulosis without evidence of definite acute diverticulitis. ASSESSMENT   Nausea/Vomiting: Resolved. EGD 1/11 with small hiatal hernia, gastritis, duodenitis. Diarrhea: No diarrhea sinus admission, reports having difficulty passing stool now. many year h/o constipation followed by diarrhea along with abdominal bloating. Last colonoscopy in 2019. Reports pain in lower abd for a month or so. C-diff and GI pathogens are negative.      PLAN  -PPI twice daily  -Follow-up path  -Clear liquid diet today  -Bowel prep today  -N.p.o. midnight  -Plan colonoscopy tomorrow for the lower abdominal pain  Patient underwent an upper endoscopy yesterday she was doing better today  Seen at the bedside with our certified physicians this is  A current plan would be for her to undergo a bowel prep and to have a colonoscopy tomorrow  To evaluate her diarrhea and her ongoing sense of tenesmus  Thank you for this kind referral    Mitchel Nelson  Discussed with Dr. Sukhjinder Paniagua, [address] [place] [address]

## 2022-01-12 NOTE — PROGRESS NOTES
[facility]    1/12/2022 10:38 AM        Name: Shanice Taveras . Admitted: 1/8/2022  Primary Care Provider: Charu Sierra (Tel: None)      Chief Complaint   Patient presents with    Emesis     Pt reports emesis, and abdominal pain, HX diverticulitis      Hospital Course: Patient is a 69 yo female with hx diverticulitis, DM2, HTN, HLD, CAD/PCI. She was diagnosed with COVID early December, her symptoms included cough and HA and had improved. Patient presented to hospital with lower abdominal pain, intractable n/v/d which started 2 days prior to admission. CT abd suggestive of mild ileus    Subjective:  Resting in bed. Tolerating regular diet. No further nausea or diarrhea. Denies abdominal pain.      Reviewed interval ancillary notes    Current Medications  0.9 % sodium chloride infusion, Continuous  pantoprazole (PROTONIX) tablet 40 mg, BID  insulin lispro (1 Unit Dial) 0-6 Units, TID WC  insulin lispro (1 Unit Dial) 0-3 Units, Nightly  lidocaine PF 1 % injection 5 mL, Once  sodium chloride flush 0.9 % injection 5-40 mL, 2 times per day  sodium chloride flush 0.9 % injection 5-40 mL, PRN  0.9 % sodium chloride infusion, PRN  enoxaparin (LOVENOX) injection 40 mg, Daily  dicyclomine (BENTYL) capsule 10 mg, 4x Daily PRN  metoprolol succinate (TOPROL XL) extended release tablet 25 mg, Nightly  clopidogrel (PLAVIX) tablet 75 mg, Daily  lisinopril (PRINIVIL;ZESTRIL) tablet 20 mg, Daily  sodium chloride flush 0.9 % injection 5-40 mL, 2 times per day  sodium chloride flush 0.9 % injection 10 mL, PRN  0.9 % sodium chloride infusion, PRN  ondansetron (ZOFRAN-ODT) disintegrating tablet 4 mg, Q8H PRN   Or  ondansetron (ZOFRAN) injection 4 mg, Q6H PRN  polyethylene glycol (GLYCOLAX) packet 17 g, Daily PRN  acetaminophen (TYLENOL) tablet 650 mg, Q6H PRN   Or  acetaminophen (TYLENOL) suppository 650 mg, Q6H PRN  potassium chloride 10 mEq/100 mL IVPB (Peripheral Line), PRN  magnesium sulfate 1000 mg in dextrose 5% 100 mL IVPB, PRN  sodium phosphate 12.69 mmol in dextrose 5 % 250 mL IVPB, PRN   Or  sodium phosphate 25.41 mmol in dextrose 5 % 250 mL IVPB, PRN  glucose (GLUTOSE) 40 % oral gel 15 g, PRN  dextrose 50 % IV solution, PRN  glucagon (rDNA) injection 1 mg, PRN  dextrose 5 % solution, PRN  insulin glargine (LANTUS;BASAGLAR) injection pen 12 Units, Nightly        Objective:  BP (!) 171/77   Pulse 58   Temp 97.2 °F (36.2 °C) (Oral)   Resp 16   Ht 5' (1.524 m)   Wt 173 lb 11.2 oz (78.8 kg)   SpO2 95%   BMI 33.92 kg/m²     Intake/Output Summary (Last 24 hours) at 1/12/2022 1038  Last data filed at 1/12/2022 0856  Gross per 24 hour   Intake 1737.23 ml   Output 550 ml   Net 1187.23 ml      Wt Readings from Last 3 Encounters:   01/12/22 173 lb 11.2 oz (78.8 kg)   11/22/19 175 lb (79.4 kg)   06/24/19 173 lb (78.5 kg)     General:  Awake, alert, oriented in NAD  Skin:  Warm and dry. No unusual bruising or rash  Neck:  Supple. No JVD or carotid bruit appreciated  Chest:  Normal effort. Clear to auscultation, no wheezes/rhonchi/rales  Cardiovascular:  RRR, normal S1/S2, no murmur/gallop/rub  Abdomen:  Soft, nontender, +bowel sounds  Extremities:  No edema  Neurological: No focal deficits  Psychological: Normal mood and affect      Labs and Tests:  CBC:   Recent Labs     01/10/22  0422 01/11/22  0550   WBC 4.3 3.5*   HGB 12.4 12.3    155     BMP:    Recent Labs     01/09/22  1106 01/10/22  0422 01/11/22  0550    137 141   K  --  3.8 3.8   CL  --  103 105   CO2  --  23 26   BUN  --  8 6*   CREATININE  --  0.6 0.6   GLUCOSE  --  101* 106*     Hepatic:   Recent Labs     01/10/22  0422 01/11/22  0550   AST 19 16   ALT 11 9*   BILITOT 0.3 0.3   ALKPHOS 59 61       Results for [facility] (MRN [id number]) as of 1/12/2022 10:45   Ref.  Range 1/11/2022 11:57 1/11/2022 16:40 1/11/2022 19:50 1/12/2022 02:10 1/12/2022 07:22 POC Glucose Latest Ref Range: 70 - 99 mg/dl 83 157 (H) 156 (H) 127 (H) 118 (H)         CT Pulmonary 1/8/2022:  No evidence of pulmonary embolism or acute pulmonary abnormality.       Moderate coronary artery calcifications.  Suggest cardiology follow-up. CT Abdomen / Pelvis 1/8/2022:  1.  Some mostly fluid filled and mildly distended small bowel loops are seen   within the mid to left abdomen without definite transition point.  This is   nonspecific but may represent a mild ileus or changes of gastroenteritis.       2.  Hypodensity of the liver parenchyma suggests fatty infiltration though   other forms of intrinsic liver disease are not excluded. No focal liver   lesion or bile duct dilation is noted.       3.  A normal appearing appendix is identified.       4.  Diverticulosis without evidence of definite acute diverticulitis. KUB 1/9/2022:  Several mildly distended loops of small bowel within the left upper quadrant,   corresponding to the findings on the previous CT scan, suggestive of an ileus. EGD 1/11/2021:  Impression: #1 gastritis #2 duodenitis  #3 hiatal hernia     Recommendations: At this time our recommendations were would be for her to be on a proton pump inhibitor oral twice daily  Await the biopsy was  If she continues to have problems with the diarrhea we may want to consider doing a colonoscopy    Problem List  Active Problems:    Nausea vomiting and diarrhea    Hyponatremia    Gastroenteritis  Resolved Problems:    * No resolved hospital problems. *       Assessment & Plan:   1. Abdominal pain / persistent nausea / loose stools. CT abdomen and pelvis (1/8) showed diverticulosis without any evidence of definitive acute diverticulitis, without any evidence of obstruction; findings suggestive of mild ileus or gastroenteritis. Abd xray (1/9) suggestive of ileus but patient is having bowel movements. GI panel negative, c. Diff also negative.  EGD 1/11 showed gastritis, duodenitis, and hiatal hernia, biopsy results pending. Recommend PPI BID. 2. HTN. BP reasonably controlled. Continue lisinopril, metoprolol. HCTZ held on admission. 3. Hyponatremia. Sodium 127 on presentation. Likely secondary to hypovolemia from inadequate po intake, GI losses and HCTZ use. Resolved with IV fluids. Nephrology on board. 4. DM2. A1c 8.2. Takes metformin at home, held on admission. Being covered with Lantus and low dose correction. BG values controlled. 5. CAD/PCI. Denies chest pain. Troponin < 0.01. Continue Plavix. Disposition: Anticipate home today if no further testing per GI. Diet: ADULT DIET;  Regular  Code:Full Code  DVT PPX: enoxaparin      DERRICK Preciado CNP   1/12/2022 10:38 AM

## 2022-01-13 ENCOUNTER — ANESTHESIA (OUTPATIENT)
Dept: ENDOSCOPY | Age: 72
DRG: 392 | End: 2022-01-13
Payer: MEDICARE

## 2022-01-13 ENCOUNTER — ANESTHESIA EVENT (OUTPATIENT)
Dept: ENDOSCOPY | Age: 72
DRG: 392 | End: 2022-01-13
Payer: MEDICARE

## 2022-01-13 VITALS
RESPIRATION RATE: 18 BRPM | HEART RATE: 64 BPM | TEMPERATURE: 97.5 F | OXYGEN SATURATION: 97 % | SYSTOLIC BLOOD PRESSURE: 181 MMHG | WEIGHT: 170.5 LBS | BODY MASS INDEX: 33.47 KG/M2 | DIASTOLIC BLOOD PRESSURE: 83 MMHG | HEIGHT: 60 IN

## 2022-01-13 VITALS
DIASTOLIC BLOOD PRESSURE: 63 MMHG | OXYGEN SATURATION: 99 % | RESPIRATION RATE: 20 BRPM | SYSTOLIC BLOOD PRESSURE: 133 MMHG

## 2022-01-13 LAB
ANION GAP SERPL CALCULATED.3IONS-SCNC: 13 MMOL/L (ref 3–16)
BLOOD CULTURE, ROUTINE: NORMAL
BUN BLDV-MCNC: 6 MG/DL (ref 7–20)
CALCIUM SERPL-MCNC: 9.2 MG/DL (ref 8.3–10.6)
CHLORIDE BLD-SCNC: 105 MMOL/L (ref 99–110)
CO2: 23 MMOL/L (ref 21–32)
CREAT SERPL-MCNC: 0.6 MG/DL (ref 0.6–1.2)
CULTURE, BLOOD 2: NORMAL
GFR AFRICAN AMERICAN: >60
GFR NON-AFRICAN AMERICAN: >60
GLUCOSE BLD-MCNC: 104 MG/DL (ref 70–99)
GLUCOSE BLD-MCNC: 105 MG/DL (ref 70–99)
GLUCOSE BLD-MCNC: 106 MG/DL (ref 70–99)
GLUCOSE BLD-MCNC: 107 MG/DL (ref 70–99)
GLUCOSE BLD-MCNC: 107 MG/DL (ref 70–99)
HCT VFR BLD CALC: 37.1 % (ref 36–48)
HEMOGLOBIN: 12.7 G/DL (ref 12–16)
MCH RBC QN AUTO: 29.2 PG (ref 26–34)
MCHC RBC AUTO-ENTMCNC: 34.1 G/DL (ref 31–36)
MCV RBC AUTO: 85.5 FL (ref 80–100)
PDW BLD-RTO: 13.2 % (ref 12.4–15.4)
PERFORMED ON: ABNORMAL
PLATELET # BLD: 174 K/UL (ref 135–450)
PMV BLD AUTO: 7.7 FL (ref 5–10.5)
POTASSIUM SERPL-SCNC: 3.8 MMOL/L (ref 3.5–5.1)
RBC # BLD: 4.34 M/UL (ref 4–5.2)
SODIUM BLD-SCNC: 141 MMOL/L (ref 136–145)
WBC # BLD: 4.9 K/UL (ref 4–11)

## 2022-01-13 PROCEDURE — 3700000000 HC ANESTHESIA ATTENDED CARE: Performed by: INTERNAL MEDICINE

## 2022-01-13 PROCEDURE — 2580000003 HC RX 258: Performed by: ANESTHESIOLOGY

## 2022-01-13 PROCEDURE — 7100000000 HC PACU RECOVERY - FIRST 15 MIN: Performed by: INTERNAL MEDICINE

## 2022-01-13 PROCEDURE — 85027 COMPLETE CBC AUTOMATED: CPT

## 2022-01-13 PROCEDURE — 6360000002 HC RX W HCPCS: Performed by: NURSE ANESTHETIST, CERTIFIED REGISTERED

## 2022-01-13 PROCEDURE — 2580000003 HC RX 258: Performed by: INTERNAL MEDICINE

## 2022-01-13 PROCEDURE — 6360000002 HC RX W HCPCS: Performed by: INTERNAL MEDICINE

## 2022-01-13 PROCEDURE — 3700000001 HC ADD 15 MINUTES (ANESTHESIA): Performed by: INTERNAL MEDICINE

## 2022-01-13 PROCEDURE — 36415 COLL VENOUS BLD VENIPUNCTURE: CPT

## 2022-01-13 PROCEDURE — 3609010300 HC COLONOSCOPY W/BIOPSY SINGLE/MULTIPLE: Performed by: INTERNAL MEDICINE

## 2022-01-13 PROCEDURE — 88305 TISSUE EXAM BY PATHOLOGIST: CPT

## 2022-01-13 PROCEDURE — 2500000003 HC RX 250 WO HCPCS: Performed by: NURSE ANESTHETIST, CERTIFIED REGISTERED

## 2022-01-13 PROCEDURE — 80048 BASIC METABOLIC PNL TOTAL CA: CPT

## 2022-01-13 PROCEDURE — 7100000001 HC PACU RECOVERY - ADDTL 15 MIN: Performed by: INTERNAL MEDICINE

## 2022-01-13 PROCEDURE — 0DBK8ZX EXCISION OF ASCENDING COLON, VIA NATURAL OR ARTIFICIAL OPENING ENDOSCOPIC, DIAGNOSTIC: ICD-10-PCS | Performed by: INTERNAL MEDICINE

## 2022-01-13 PROCEDURE — 2709999900 HC NON-CHARGEABLE SUPPLY: Performed by: INTERNAL MEDICINE

## 2022-01-13 PROCEDURE — 6370000000 HC RX 637 (ALT 250 FOR IP): Performed by: INTERNAL MEDICINE

## 2022-01-13 PROCEDURE — 0DBN8ZX EXCISION OF SIGMOID COLON, VIA NATURAL OR ARTIFICIAL OPENING ENDOSCOPIC, DIAGNOSTIC: ICD-10-PCS | Performed by: INTERNAL MEDICINE

## 2022-01-13 RX ORDER — PANTOPRAZOLE SODIUM 40 MG/1
40 TABLET, DELAYED RELEASE ORAL 2 TIMES DAILY
Qty: 30 TABLET | Refills: 1 | Status: SHIPPED | OUTPATIENT
Start: 2022-01-13

## 2022-01-13 RX ORDER — LIDOCAINE HYDROCHLORIDE 20 MG/ML
INJECTION, SOLUTION EPIDURAL; INFILTRATION; INTRACAUDAL; PERINEURAL PRN
Status: DISCONTINUED | OUTPATIENT
Start: 2022-01-13 | End: 2022-01-13 | Stop reason: SDUPTHER

## 2022-01-13 RX ORDER — PROPOFOL 10 MG/ML
INJECTION, EMULSION INTRAVENOUS PRN
Status: DISCONTINUED | OUTPATIENT
Start: 2022-01-13 | End: 2022-01-13 | Stop reason: SDUPTHER

## 2022-01-13 RX ADMIN — PROPOFOL 50 MG: 10 INJECTION, EMULSION INTRAVENOUS at 09:01

## 2022-01-13 RX ADMIN — ONDANSETRON 4 MG: 2 INJECTION INTRAMUSCULAR; INTRAVENOUS at 10:22

## 2022-01-13 RX ADMIN — PROPOFOL 140 MCG/KG/MIN: 10 INJECTION, EMULSION INTRAVENOUS at 09:02

## 2022-01-13 RX ADMIN — Medication 10 ML: at 10:22

## 2022-01-13 RX ADMIN — SODIUM CHLORIDE: 9 INJECTION, SOLUTION INTRAVENOUS at 08:09

## 2022-01-13 RX ADMIN — PANTOPRAZOLE SODIUM 40 MG: 40 TABLET, DELAYED RELEASE ORAL at 10:21

## 2022-01-13 RX ADMIN — LIDOCAINE HYDROCHLORIDE 100 MG: 20 INJECTION, SOLUTION EPIDURAL; INFILTRATION; INTRACAUDAL; PERINEURAL at 09:01

## 2022-01-13 RX ADMIN — CLOPIDOGREL BISULFATE 75 MG: 75 TABLET ORAL at 10:21

## 2022-01-13 RX ADMIN — LISINOPRIL 20 MG: 20 TABLET ORAL at 10:21

## 2022-01-13 ASSESSMENT — PULMONARY FUNCTION TESTS
PIF_VALUE: 1
PIF_VALUE: 0
PIF_VALUE: 1

## 2022-01-13 ASSESSMENT — LIFESTYLE VARIABLES: SMOKING_STATUS: 0

## 2022-01-13 ASSESSMENT — PAIN - FUNCTIONAL ASSESSMENT: PAIN_FUNCTIONAL_ASSESSMENT: 0-10

## 2022-01-13 NOTE — PROCEDURES
Colonoscopy Procedure  Note          Patient: Irving Read  : 1950  CRN:  @[id number]@    Procedure: Colonoscopy with cold biopsy    Date:  2022    Surgeon:  Jorge Whitman MD, MD    Referring Physician:  Lachelle Dhaliwal    Preoperative Diagnosis:  Abnormal CT    Postoperative Diagnosis: #1 4 small polyps in sigmoid colon  #2 diverticulosis of the sigmoid  #3 internal hemorrhoids  #4 otherwise normal colon    Anesthesia:  MAC    EBL: Minimal to none. Indications: This is a 70y.o. year old female who presents today with she had an acute onset of severe diarrhea where she did have some evidence of inflammation on CT scan so doing a colonoscopy      Procedure: An informed consent was obtained from the patient after explanation of indications, benefits, possible risks and complications of the procedure. The patient was then taken to the endoscopy suite, placed in the left lateral decubitus position, and the above IV anesthesia was administered. Digital rectal examination was performed. There were no masses good rectal tone      Rectum 4 view was normal retroflexed view she does have large internal hemorrhoid    Sigmoid extensive diverticulosis also there were 4 small polyps that were removed with biopsy forcep    Descending normal    Transverse normal    Ascending normal we did take several biopsies to look for microscopic colitis    Cecum normal    TI normal for 12cm      The patient tolerated the procedure well and was taken to the PACU in good condition. There were no immediate complications. Impression: 1 4 small sigmoid polyps removed #2 diverticulosis of the sigmoid colon #3 internal hemorrhoids medium to large size #4 otherwise normal    Recommendations:  Today's colonoscopy there is no overt abnormalities noted that would be causing the severe diarrhea also the patient feels on occasions that she has constipation since she has had a severe diarrhea her rectum was perfectly normal today    I do feel that the patient can undergo regular diet    If she is otherwise doing well she will be discharged home later this afternoon    Her next colonoscopy given the 4 small polyps can be a 5-year    She continues to have issues with her gastrointestinal system I will be more than happy to see her in outpatient clinic at any time she can call  [phone] thank thank you    Gal Molina MD, MD   [facility]  1/13/2022      Please note that some or all of this record was generated using voice recognition software. If there are any questions about the content of this document, please contact the author as some errors in translation may have occurred.

## 2022-01-13 NOTE — H&P
Gastroenterology Note             Pre-operative History and Physical    Patient: Shanice Taveras  : 1950  CSN:     History Obtained From:  patient and/or guardian. HISTORY OF PRESENT ILLNESS:    The patient is a 70 y.o. female  here for colonoscopy. Was admitted with a persistent nausea vomiting diarrhea we did an upper endoscopy she was planning on having an outpatient colon but then she wanted to have a colonoscopy done while she was here in the hospital    Past Medical History:    Past Medical History:   Diagnosis Date    Diverticulitis     Hyperlipidemia     Hypertension     Type 2 diabetes mellitus ([address]      Past Surgical History:    Past Surgical History:   Procedure Laterality Date    CORONARY ANGIOPLASTY WITH STENT PLACEMENT      OVARY REMOVAL  right    FOR OVARIAN CYSTS    UPPER GASTROINTESTINAL ENDOSCOPY N/A 2022    EGD DUODENAL AND GASTRIC  BIOPSY performed by Gal Molina MD at [address] [place] [address]     Medications Prior to Admission:   No current facility-administered medications on file prior to encounter. Current Outpatient Medications on File Prior to Encounter   Medication Sig Dispense Refill    metFORMIN (GLUCOPHAGE) 500 MG tablet Take 500 mg by mouth 2 times daily (with meals)      METOPROLOL SUCCINATE PO Take 25 mg by mouth daily       clopidogrel (PLAVIX) 75 MG tablet Take 75 mg by mouth daily      lisinopril-hydrochlorothiazide (PRINZIDE;ZESTORETIC) 20-12.5 MG per tablet Take 1 tablet by mouth daily. Allergies:  Patient has no known allergies.       Social History:   Social History     Tobacco Use    Smoking status: Never Smoker    Smokeless tobacco: Never Used   Substance Use Topics    Alcohol use: No     Family History:   Family History   Problem Relation Age of Onset    Heart Disease Mother        PHYSICAL EXAM:      BP (!) 165/70   Pulse 64   Temp 96.7 °F (35.9 °C) (Temporal)   Resp 12   Ht 5' (1.524 m)   Wt 170 lb 8 oz (77.3 kg) SpO2 97%   BMI 33.30 kg/m²  I        Heart:   RRR, normal s1s2    Lungs:  CTA bilat,  Normal effort    Abdomen:   NT, ND      ASA Grade:  ASA 3 - Patient with moderate systemic disease with functional limitations    Mallampati Class: 3          ASSESSMENT AND PLAN:    1. Patient is a 70 y.o. female here for /Colonoscopy with MAC.   2.  Procedure options, risks and benefits reviewed with patient. Patient expresses understanding.     Aneudy Rogel MD,   [address]  1/13/2022

## 2022-01-13 NOTE — PROGRESS NOTES
Data- discharge order received, pt verbalized agreement to discharge, disposition to previous residence, no needs for HHC/DME. Action- discharge instructions prepared and given to pt, pt verbalized understanding. Medication information packet given r/t NEW and/or CHANGED prescriptions emphasizing name/purpose/side effects, pt verbalized understanding. Discharge instruction summary: Diet- as tolerated, Activity- as tolerated, [facility] [phone] f/u appointment needs to be scheduled, immunizations reviewed, prescription medications filled . Inpatient surgical procedure precautions reviewed: handouts given. CHF Education reviewed. Pt/ Family has had a total of 60 minutes CHF education this admission encounter. 1. WEIGHT: Admit Weight: 175 lb (79.4 kg) (01/08/22 1536)        Today  Weight: 170 lb 8 oz (77.3 kg) (01/13/22 0527)       2. O2 SAT.: SpO2: 97 % (01/13/22 1014)    Response- Pt belongings gathered, IV removed. Disposition is home (no HHC/DME needs), transported with [employer], taken to lobby via w/c w/ staff, no complications.

## 2022-01-13 NOTE — ANESTHESIA POSTPROCEDURE EVALUATION
Department of Anesthesiology  Postprocedure Note    Patient: Madeleine Fleming  MRN: [id number]  YOB: 1950  Date of evaluation: 1/13/2022  Time:  9:37 AM     Procedure Summary     Date: 01/13/22 Room / Location: [address] [place] [address]    Anesthesia Start: 1496 Anesthesia Stop: 1997    Procedure: COLONOSCOPY WITH BIOPSY (N/A ) Diagnosis: (Abnormal CT)    Surgeons: Joslyn Marin MD Responsible Provider: Dougie May MD    Anesthesia Type: MAC ASA Status: 3          Anesthesia Type: MAC    Tuan Phase I: Tuan Score: 10    Tuan Phase II:      Last vitals: Reviewed and per EMR flowsheets.        Anesthesia Post Evaluation    Patient location during evaluation: PACU  Patient participation: complete - patient participated  Level of consciousness: awake  Airway patency: patent  Nausea & Vomiting: no vomiting and no nausea  Complications: no  Cardiovascular status: blood pressure returned to baseline  Respiratory status: acceptable  Hydration status: stable

## 2022-01-13 NOTE — ANESTHESIA PRE PROCEDURE
Department of Anesthesiology  Preprocedure Note       Name:  Lizzie Lafleur   Age:  70 y.o.  :  1950                                          MRN:  [id number]         Date:  2022      Surgeon: Dino Jade):  Aneudy Rogel MD    Procedure: Procedure(s):  COLONOSCOPY DIAGNOSTIC    Medications prior to admission:   Prior to Admission medications    Medication Sig Start Date End Date Taking? Authorizing Provider   metFORMIN (GLUCOPHAGE) 500 MG tablet Take 500 mg by mouth 2 times daily (with meals)   Yes Historical Provider, MD   METOPROLOL SUCCINATE PO Take 25 mg by mouth daily    Yes Historical Provider, MD   clopidogrel (PLAVIX) 75 MG tablet Take 75 mg by mouth daily   Yes Historical Provider, MD   lisinopril-hydrochlorothiazide (PRINZIDE;ZESTORETIC) 20-12.5 MG per tablet Take 1 tablet by mouth daily.      Yes Historical Provider, MD       Current medications:    Current Facility-Administered Medications   Medication Dose Route Frequency Provider Last Rate Last Admin    0.9 % sodium chloride infusion   IntraVENous Continuous Belinda Ulrich MD        pantoprazole (PROTONIX) tablet 40 mg  40 mg Oral BID DERRICK Pena - CNP   40 mg at 22    insulin lispro (1 Unit Dial) 0-6 Units  0-6 Units SubCUTAneous TID WC Saji Yung PA-C        insulin lispro (1 Unit Dial) 0-3 Units  0-3 Units SubCUTAneous Nightly Saji Yung PA-C   1 Units at 22    lidocaine PF 1 % injection 5 mL  5 mL IntraDERmal Once Aneudy Rogel MD        sodium chloride flush 0.9 % injection 5-40 mL  5-40 mL IntraVENous 2 times per day Aneudy Rogel MD   10 mL at 22    sodium chloride flush 0.9 % injection 5-40 mL  5-40 mL IntraVENous PRN Aneudy Rogel MD        0.9 % sodium chloride infusion  25 mL IntraVENous PRN Aneudy Rogel MD        enoxaparin (LOVENOX) injection 40 mg  40 mg SubCUTAneous Daily Aneudy Rogel MD   40 mg at 22    dicyclomine (BENTYL) capsule 10 mg  10 mg Oral 4x Daily PRN Nancy Leon MD   10 mg at 01/10/22 2108    metoprolol succinate (TOPROL XL) extended release tablet 25 mg  25 mg Oral Nightly Nancy Leon MD   25 mg at 01/12/22 2129    clopidogrel (PLAVIX) tablet 75 mg  75 mg Oral Daily Nancy Leon MD   75 mg at 01/12/22 0128    lisinopril (PRINIVIL;ZESTRIL) tablet 20 mg  20 mg Oral Daily Nancy Leon MD   20 mg at 01/12/22 5020    sodium chloride flush 0.9 % injection 5-40 mL  5-40 mL IntraVENous 2 times per day Nancy Leon MD   10 mL at 01/12/22 0835    sodium chloride flush 0.9 % injection 10 mL  10 mL IntraVENous PRN Nancy Leon MD        0.9 % sodium chloride infusion  25 mL IntraVENous PRN Nancy Leon MD        ondansetron (ZOFRAN-ODT) disintegrating tablet 4 mg  4 mg Oral Q8H PRN Nancy Leon MD        Or    ondansetron [facility]) injection 4 mg  4 mg IntraVENous Q6H PRN Nancy Leon MD   4 mg at 01/09/22 1429    polyethylene glycol (GLYCOLAX) packet 17 g  17 g Oral Daily PRN Nancy Leon MD        acetaminophen (TYLENOL) tablet 650 mg  650 mg Oral Q6H PRN Nancy Leon MD   650 mg at 01/12/22 0230    Or    acetaminophen (TYLENOL) suppository 650 mg  650 mg Rectal Q6H PRN Nancy Leon MD        potassium chloride 10 mEq/100 mL IVPB (Peripheral Line)  10 mEq IntraVENous PRN Nancy Leon MD        magnesium sulfate 1000 mg in dextrose 5% 100 mL IVPB  1,000 mg IntraVENous PRN Nancy Leon MD        sodium phosphate 12.69 mmol in dextrose 5 % 250 mL IVPB  0.16 mmol/kg IntraVENous PRN Nancy Leon MD        Or    sodium phosphate 25.41 mmol in dextrose 5 % 250 mL IVPB  0.32 mmol/kg IntraVENous PRN Nancy Leon MD        glucose (GLUTOSE) 40 % oral gel 15 g  15 g Oral PRN Nancy Leon MD        dextrose 50 % IV solution  12.5 g IntraVENous PRN Nancy Leon MD        glucagon (rDNA) injection 1 mg  1 mg IntraMUSCular PRN Nancy Leon MD        dextrose 5 % solution  100 mL/hr IntraVENous PRN Nancy Leon MD        insulin glargine (LANTUS;BASAGLAR) injection pen 12 Units  0.15 Units/kg SubCUTAneous Nightly Shelbie Stephens MD   12 Units at 01/12/22 2136       Allergies:  No Known Allergies    Problem List:    Patient Active Problem List   Diagnosis Code    Diverticulitis of sigmoid colon K57.32    Nausea vomiting and diarrhea R11.2, R19.7    Hyponatremia E87.1    Gastroenteritis K52.9       Past Medical History:        Diagnosis Date    Diverticulitis     Hyperlipidemia     Hypertension        Past Surgical History:        Procedure Laterality Date    CORONARY ANGIOPLASTY WITH STENT PLACEMENT      OVARY REMOVAL  right    FOR OVARIAN CYSTS    UPPER GASTROINTESTINAL ENDOSCOPY N/A 1/11/2022    EGD DUODENAL AND GASTRIC  BIOPSY performed by Shelbie Stephens MD at [id number] [email or web address]       Social History:    Social History     Tobacco Use    Smoking status: Never Smoker    Smokeless tobacco: Never Used   Substance Use Topics    Alcohol use: No                                Counseling given: Not Answered      Vital Signs (Current):   Vitals:    01/12/22 1913 01/13/22 0013 01/13/22 0412 01/13/22 0527   BP: (!) 158/72 (!) 177/72 (!) 154/68    Pulse: 63 57 58    Resp: 18 18 18    Temp: 98 °F (36.7 °C) 97.5 °F (36.4 °C) 97.6 °F (36.4 °C)    TempSrc: Oral Oral Oral    SpO2: 95% 95% 93%    Weight:    170 lb 8 oz (77.3 kg)   Height:                                                  BP Readings from Last 3 Encounters:   01/13/22 (!) 154/68   01/11/22 (!) 156/68   11/22/19 (!) 171/63       NPO Status: Time of last liquid consumption: 0000                        Time of last solid consumption: 0000                        Date of last liquid consumption: 01/11/22                        Date of last solid food consumption: 01/11/22    BMI:   Wt Readings from Last 3 Encounters:   01/13/22 170 lb 8 oz (77.3 kg)   11/22/19 175 lb (79.4 kg)   06/24/19 173 lb (78.5 kg)     Body mass index is 33.3 kg/m².     CBC:   Lab Results   Component Value Date    WBC 4.9 01/13/2022    RBC 4.34 01/13/2022    HGB 12.7 01/13/2022    HCT 37.1 01/13/2022    MCV 85.5 01/13/2022    RDW 13.2 01/13/2022     01/13/2022       CMP:   Lab Results   Component Value Date     01/13/2022    K 3.8 01/13/2022    K 3.8 01/11/2022     01/13/2022    CO2 23 01/13/2022    BUN 6 01/13/2022    CREATININE 0.6 01/13/2022    GFRAA >60 01/13/2022    GFRAA >60 01/06/2013    AGRATIO 1.2 01/11/2022    LABGLOM >60 01/13/2022    GLUCOSE 107 01/13/2022    PROT 6.2 01/11/2022    PROT 7.7 01/06/2013    CALCIUM 9.2 01/13/2022    BILITOT 0.3 01/11/2022    ALKPHOS 61 01/11/2022    AST 16 01/11/2022    ALT 9 01/11/2022       POC Tests:   Recent Labs     01/13/22  0735   POCGLU 106*       Coags:   Lab Results   Component Value Date    PROTIME 12.2 01/06/2013    INR 1.07 01/06/2013    APTT 29.7 01/06/2013       HCG (If Applicable): No results found for: PREGTESTUR, PREGSERUM, HCG, HCGQUANT     ABGs: No results found for: PHART, PO2ART, LOI3NDB, KND3EHS, BEART, I3FMCTIH     Type & Screen (If Applicable):  No results found for: LABABO, LABRH    Drug/Infectious Status (If Applicable):  No results found for: HIV, HEPCAB    COVID-19 Screening (If Applicable):   Lab Results   Component Value Date    COVID19 Not Detected 01/10/2022           Anesthesia Evaluation  Patient summary reviewed and Nursing notes reviewed no history of anesthetic complications:   Airway: Mallampati: III  TM distance: >3 FB   Neck ROM: full  Mouth opening: > = 3 FB Dental:    (+) partials      Pulmonary:Negative Pulmonary ROS and normal exam  breath sounds clear to auscultation      (-) COPD, asthma, sleep apnea and not a current smoker                           Cardiovascular:    (+) hypertension:, CAD (neg stress test nml EF 2020; no issues since stent, no ntg use):, CABG/stent (stent 2016):, hyperlipidemia    (-) dysrhythmias,  angina and  CHF (echo 2020 EF 65-70 gr 1 dd)    ECG reviewed  Rhythm: regular  Rate: normal  Echocardiogram reviewed  Stress test reviewed Neuro/Psych:   Negative Neuro/Psych ROS     (-) seizures, TIA and CVA           GI/Hepatic/Renal: Neg GI/Hepatic/Renal ROS       (-) GERD, liver disease and no renal disease       Endo/Other:    (+) Diabetes (a1c 8.2)Type II DM, , .    (-) hypothyroidism, hyperthyroidism               Abdominal:   (+) obese,           Vascular: Other Findings:             Anesthesia Plan      MAC     ASA 3       Induction: intravenous. Anesthetic plan and risks discussed with patient. Plan discussed with CRNA.                   Tony Rogers MD   1/13/2022

## 2022-01-13 NOTE — PROGRESS NOTES
Patient arrived from endo to pacu. On 3 L n/c. SB on the monitor. VSS.  Abdomen soft    Electronically signed by Tammie Mercado RN on 1/13/2022 at 1015

## 2022-01-13 NOTE — PROGRESS NOTES
Message sent to Caroline Signs NP \"/83, HR 61 given lisinopril s/p colonoscopy. Just checked BP prior to d/c, /73 left arm, 165/87 right arm. D/c paperwork reviewed, family here at bedside. Just wanted to see if you had any further tx prior to d/c. Thank you! \" Response received, no new orders.

## 2022-01-13 NOTE — DISCHARGE SUMMARY
[facility] DISCHARGE SUMMARY    Patient Demographics    Patient. Karey Castellanos  Date of Birth. 1950  MRN. [id number]     Primary care provider. Thom Izaguirre  (Tel: [phone])    Admit date: 1/8/2022    Discharge date (blank if same as Note Date): Note Date: 1/13/2022     Reason for Hospitalization. Chief Complaint   Patient presents with    Emesis     Pt reports emesis, and abdominal pain, HX diverticulitis          Significant Findings. Active Problems:    Nausea vomiting and diarrhea    Hyponatremia    Gastroenteritis  Resolved Problems:    * No resolved hospital problems. *       Problems and results from this hospitalization that need follow up. 1. Follow up with GI for biopsy results of EGD and colonoscopy. Significant test results and incidental findings. CT Pulmonary 1/8/2022:  No evidence of pulmonary embolism or acute pulmonary abnormality.       Moderate coronary artery calcifications.  Suggest cardiology follow-up.      CT Abdomen / Pelvis 1/8/2022:  1.  Some mostly fluid filled and mildly distended small bowel loops are seen   within the mid to left abdomen without definite transition point.  This is   nonspecific but may represent a mild ileus or changes of gastroenteritis.       2.  Hypodensity of the liver parenchyma suggests fatty infiltration though   other forms of intrinsic liver disease are not excluded. No focal liver   lesion or bile duct dilation is noted.       3.  A normal appearing appendix is identified.       4.  Diverticulosis without evidence of definite acute diverticulitis.      KUB 1/9/2022:  Several mildly distended loops of small bowel within the left upper quadrant,   corresponding to the findings on the previous CT scan, suggestive of an ileus. Invasive procedures and treatments.    EGD 1/11/2021:  Impression: #1 gastritis #2 duodenitis  #3 hiatal hernia     Recommendations: At this time our recommendations were would be for her to be on a proton pump inhibitor oral twice daily  Await the biopsy was  If she continues to have problems with the diarrhea we may want to consider doing a colonoscopy      Colonoscopy 1/13/2022:  Impression: 1 4 small sigmoid polyps removed #2 diverticulosis of the sigmoid colon #3 internal hemorrhoids medium to large size #4 otherwise normal     Recommendations: Today's colonoscopy there is no overt abnormalities noted that would be causing the severe diarrhea also the patient feels on occasions that she has constipation since she has had a severe diarrhea her rectum was perfectly normal today     I do feel that the patient can undergo regular diet     If she is otherwise doing well she will be discharged home later this afternoon     Her next colonoscopy given the 4 small polyps can be a [address] Course. Patient is a 71 yo female with hx diverticulitis, DM2, HTN, HLD, CAD/PCI. She was diagnosed with COVID early December, her symptoms included cough and HA and had improved. Patient presented to hospital with lower abdominal pain, intractable n/v/d which started 2 days prior to admission. CT abd suggestive of mild ileus    1. Abdominal pain / persistent nausea / loose stools. CT abdomen and pelvis (1/8) showed diverticulosis without any evidence of definitive acute diverticulitis, without any evidence of obstruction; findings suggestive of mild ileus or gastroenteritis. Abd xray (1/9) suggestive of ileus but patient is having bowel movements. GI panel negative, c. Diff also negative. EGD 1/11 showed gastritis, duodenitis, and hiatal hernia, biopsy results see below (possible celiac disease). GI recommends PPI BID. 2. HTN. BP reasonably controlled. Continued lisinopril, metoprolol. HCTZ held on admission, okay per nephrology to resume on DC. 3. Hyponatremia. Sodium 127 on presentation.  Likely secondary to hypovolemia from inadequate po intake, GI losses and HCTZ use. Resolved with IV fluids. Nephrology on board. 4. DM2. A1c 8.2. Takes metformin at home, held on admission. Covered with Lantus and low dose correction in hospital. BG values controlled. Okay per nephrology to resume metformin on DC   5. CAD/PCI. Denied chest pain. Troponin < 0.01. Continued Plavix. States she feels much better, tolerating diet. Victor Manuel Denny for Dc home. Reviewed DC plans, follow up and medications. Expresses understanding. Questions answered. A. Duodenum, biopsy:   - Small intestinal mucosa with partial villous blunting and mildly   increased intraepithelial lymphocytes- See Comment. B. Stomach, biopsy:   - Antral and oxyntic mucosa with mild reactive change. - No Helicobacter-like organisms identified on H&E-stained sections. COMMENT: Part A: the etiologic differential diagnosis includes   sensitivity to gluten and non-gluten proteins (celiac disease),   infection, small intestinal bacterial overgrowth, stasis related changes,   protein calorie malnutrition and medication injury. If celiac disease is   a clinical concern, correlation with celiac disease specific antibodies   may be worthwhile.    JINMI/JINMI     A.  Ascending colon, biopsy:   - No diagnostic abnormality.   - Negative for microscopic colitis or active colitis. B.  Colon polyp, sigmoid x4:   - Hyperplastic polyp (multiple fragments). Consults. IP CONSULT TO HOSPITALIST  IP CONSULT TO NEPHROLOGY  IP CONSULT TO GI    Physical examination on discharge day. BP (!) 179/83   Pulse 61   Temp 97.5 °F (36.4 °C) (Oral)   Resp 18   Ht 5' (1.524 m)   Wt 170 lb 8 oz (77.3 kg)   SpO2 97%   BMI 33.30 kg/m²   General appearance. Alert. Looks comfortable. HEENT. Sclera clear. Moist mucus membranes. Cardiovascular. Regular rate and rhythm, normal S1, S2. No murmur. Respiratory. Not using accessory muscles. Clear to auscultation bilaterally, no wheeze. Gastrointestinal. Abdomen soft, non-tender, not distended, normal bowel sounds  Neurology. Facial symmetry. No speech deficits. Moving all extremities equally. Extremities. No edema in lower extremities. Skin. Warm, dry, normal turgor    Condition at time of discharge stable    Medication instructions provided to patient at discharge. Medication List      START taking these medications    pantoprazole 40 MG tablet  Commonly known as: PROTONIX  Take 1 tablet by mouth 2 times daily  Notes to patient: Pantoprozole/ Protonix  Use: reduces stomach acid, protects the digestive tract  Side effects: headache or diarrhea        CONTINUE taking these medications    clopidogrel 75 MG tablet  Commonly known as: PLAVIX  Notes to patient: Use: prevention of blood clots  Side effects: bruise easily, nosebleed, generalized discomfort     lisinopril-hydroCHLOROthiazide 20-12.5 MG per tablet  Commonly known as: PRINZIDE;ZESTORETIC  Notes to patient: Lisinopril is used to treat high blood pressure, help heart function after a heart attack, help a weak heart. Side effects: dizziness, headache, cough. metFORMIN 500 MG tablet  Commonly known as: GLUCOPHAGE  Notes to patient: Metformin (Glucophage*)  Use: treats diabetes or high blood sugar  Side effects: upset stomach, low blood sugar     METOPROLOL SUCCINATE PO  Notes to patient: Lopressor/Metoprolol is used to treat high blood pressure, chest pain or pressure. Side effects: dizziness, feeling tired, loose stools, throwing up           Where to Get Your Medications      These medications were sent to [facility], [facility] [address]    Phone: [phone]   · pantoprazole 40 MG tablet         Discharge recommendations given to patient. Follow Up. PCP in 1 week   Disposition. home  Activity. Off work x 1 week  Diet: ADULT DIET;  Regular Spent > 30 minutes in discharge process.     Signed:  DERRICK Willson CNP     1/13/2022 11:57 AM

## 2022-01-13 NOTE — PROGRESS NOTES
Patient's awake and alert. On room air. NSR on the monitor. VSS. Denies pain. Tolerating PO. Patient meets criteria to be discharged from phase 1.  Will prepare for transfer to room at this time    Electronically signed by Jeanne Cummings RN on 1/13/2022 at 9260

## 2022-01-14 ENCOUNTER — CARE COORDINATION (OUTPATIENT)
Dept: CASE MANAGEMENT | Age: 72
End: 2022-01-14

## 2022-01-14 RX ORDER — DICYCLOMINE HYDROCHLORIDE 10 MG/1
10 CAPSULE ORAL 2 TIMES DAILY
Qty: 60 CAPSULE | Refills: 3 | Status: SHIPPED | OUTPATIENT
Start: 2022-01-14

## 2022-01-14 NOTE — CARE COORDINATION
Care Transitions Outreach Attempt    Call within 2 business days of discharge: Yes   Attempted to reach patient for transitions of care follow up. Unable to reach patient. Unable to lvm. Home number busy and mobile just rang. Alex Padron LPN, [facility][note 3] 30: [phone]      Patient: Cali Alonzo Patient : 1950 MRN: <[id number]>    Last Discharge [facility]       Complaint Diagnosis Description Type Department Provider    22 Emesis Nausea vomiting and diarrhea . .. ED to Hosp-Admission (Discharged) (ADMITTED) Rolf Coleman MD; Kit. .. Was this an external facility discharge? No Discharge Facility:     Noted following upcoming appointments from discharge chart review:   [facility] follow up appointment(s): No future appointments.   Non-[facility] follow up appointment(s):

## 2022-01-17 ENCOUNTER — CARE COORDINATION (OUTPATIENT)
Dept: CASE MANAGEMENT | Age: 72
End: 2022-01-17

## 2022-01-17 NOTE — CARE COORDINATION
[facility] Transitions Initial Follow Up Call    Call within 2 business days of discharge: Yes    Patient: Cheyenne Hancock Patient : 1950   MRN: [id number]  Reason for Admission: gastritis, duodenitis, hiatal hernia; diverticulosis  Discharge Date: 22 RARS: Readmission Risk Score: 8.1 ( )    Second and final attempt at 24 hour discharge call, no answer at both contact numbers. Patient is not active in My Chart and does not have a [facility] PCP. CTN will resolve episode and remain available.     Alexei Ramos, AP

## 2022-06-10 PROBLEM — R53.81 MALAISE AND FATIGUE: Status: ACTIVE | Noted: 2020-08-06

## 2022-06-10 PROBLEM — E78.00 PURE HYPERCHOLESTEROLEMIA: Status: ACTIVE | Noted: 2017-01-10

## 2022-06-10 PROBLEM — U07.1 COVID-19 VIRUS INFECTION: Status: ACTIVE | Noted: 2021-12-07

## 2022-06-10 PROBLEM — M85.89 OSTEOPENIA OF MULTIPLE SITES: Status: ACTIVE | Noted: 2019-01-03

## 2022-06-10 PROBLEM — F33.9 MAJOR DEPRESSION, RECURRENT, CHRONIC (HCC): Status: ACTIVE | Noted: 2019-06-24

## 2022-06-10 PROBLEM — R16.1 SPLENOMEGALY: Status: ACTIVE | Noted: 2020-01-06

## 2022-06-10 PROBLEM — Z95.5 STENTED CORONARY ARTERY: Status: ACTIVE | Noted: 2020-08-06

## 2022-06-10 PROBLEM — F41.1 GENERALIZED ANXIETY DISORDER: Status: ACTIVE | Noted: 2019-06-24

## 2022-06-10 PROBLEM — R53.83 MALAISE AND FATIGUE: Status: ACTIVE | Noted: 2020-08-06

## 2022-06-10 PROBLEM — K63.5 POLYP OF COLON: Status: ACTIVE | Noted: 2019-07-23

## 2022-06-10 PROBLEM — K76.0 FATTY LIVER: Status: ACTIVE | Noted: 2019-10-17

## (undated) DEVICE — PROCEDURE KIT ENDOSCP CUST

## (undated) DEVICE — SOLUTION IV IRRIG WATER 500ML POUR BRL ST 2F7113

## (undated) DEVICE — SET VLV 3 PC AWS DISPOSABLE GRDIAN SCOPEVALET

## (undated) DEVICE — BW-412T DISP COMBO CLEANING BRUSH: Brand: SINGLE USE COMBINATION CLEANING BRUSH

## (undated) DEVICE — FORCEPS BX L240CM WRK CHN 2.8MM STD CAP W/ NDL MIC MESH

## (undated) DEVICE — MOUTHPIECE ENDOSCP L CTRL OPN AND SIDE PORTS DISP